# Patient Record
Sex: FEMALE | Race: WHITE | NOT HISPANIC OR LATINO | URBAN - METROPOLITAN AREA
[De-identification: names, ages, dates, MRNs, and addresses within clinical notes are randomized per-mention and may not be internally consistent; named-entity substitution may affect disease eponyms.]

---

## 2022-01-20 ENCOUNTER — OUTPATIENT (OUTPATIENT)
Dept: OUTPATIENT SERVICES | Facility: HOSPITAL | Age: 72
LOS: 1 days | End: 2022-01-20
Payer: MEDICARE

## 2022-01-20 VITALS
SYSTOLIC BLOOD PRESSURE: 120 MMHG | TEMPERATURE: 98 F | DIASTOLIC BLOOD PRESSURE: 86 MMHG | OXYGEN SATURATION: 96 % | HEART RATE: 89 BPM | HEIGHT: 66 IN | RESPIRATION RATE: 20 BRPM | WEIGHT: 195.33 LBS

## 2022-01-20 DIAGNOSIS — Z90.89 ACQUIRED ABSENCE OF OTHER ORGANS: Chronic | ICD-10-CM

## 2022-01-20 DIAGNOSIS — I48.20 CHRONIC ATRIAL FIBRILLATION, UNSPECIFIED: ICD-10-CM

## 2022-01-20 DIAGNOSIS — Z98.890 OTHER SPECIFIED POSTPROCEDURAL STATES: Chronic | ICD-10-CM

## 2022-01-20 DIAGNOSIS — Z90.722 ACQUIRED ABSENCE OF OVARIES, BILATERAL: Chronic | ICD-10-CM

## 2022-01-20 DIAGNOSIS — U07.1 COVID-19: ICD-10-CM

## 2022-01-20 DIAGNOSIS — Z01.818 ENCOUNTER FOR OTHER PREPROCEDURAL EXAMINATION: ICD-10-CM

## 2022-01-20 DIAGNOSIS — Z98.51 TUBAL LIGATION STATUS: Chronic | ICD-10-CM

## 2022-01-20 LAB
ANION GAP SERPL CALC-SCNC: 10 MMOL/L — SIGNIFICANT CHANGE UP (ref 5–17)
APTT BLD: 35.8 SEC — HIGH (ref 27.5–35.5)
BASOPHILS # BLD AUTO: 0.06 K/UL — SIGNIFICANT CHANGE UP (ref 0–0.2)
BASOPHILS NFR BLD AUTO: 0.8 % — SIGNIFICANT CHANGE UP (ref 0–2)
BLD GP AB SCN SERPL QL: SIGNIFICANT CHANGE UP
BUN SERPL-MCNC: 18.2 MG/DL — SIGNIFICANT CHANGE UP (ref 8–20)
CALCIUM SERPL-MCNC: 8.8 MG/DL — SIGNIFICANT CHANGE UP (ref 8.6–10.2)
CHLORIDE SERPL-SCNC: 105 MMOL/L — SIGNIFICANT CHANGE UP (ref 98–107)
CO2 SERPL-SCNC: 25 MMOL/L — SIGNIFICANT CHANGE UP (ref 22–29)
CREAT SERPL-MCNC: 0.69 MG/DL — SIGNIFICANT CHANGE UP (ref 0.5–1.3)
EOSINOPHIL # BLD AUTO: 0.25 K/UL — SIGNIFICANT CHANGE UP (ref 0–0.5)
EOSINOPHIL NFR BLD AUTO: 3.2 % — SIGNIFICANT CHANGE UP (ref 0–6)
GLUCOSE SERPL-MCNC: 97 MG/DL — SIGNIFICANT CHANGE UP (ref 70–99)
HCT VFR BLD CALC: 44.6 % — SIGNIFICANT CHANGE UP (ref 34.5–45)
HGB BLD-MCNC: 14.4 G/DL — SIGNIFICANT CHANGE UP (ref 11.5–15.5)
IMM GRANULOCYTES NFR BLD AUTO: 0.1 % — SIGNIFICANT CHANGE UP (ref 0–1.5)
INR BLD: 1.28 RATIO — HIGH (ref 0.88–1.16)
LYMPHOCYTES # BLD AUTO: 2.62 K/UL — SIGNIFICANT CHANGE UP (ref 1–3.3)
LYMPHOCYTES # BLD AUTO: 34 % — SIGNIFICANT CHANGE UP (ref 13–44)
MAGNESIUM SERPL-MCNC: 2.1 MG/DL — SIGNIFICANT CHANGE UP (ref 1.6–2.6)
MCHC RBC-ENTMCNC: 28.8 PG — SIGNIFICANT CHANGE UP (ref 27–34)
MCHC RBC-ENTMCNC: 32.3 GM/DL — SIGNIFICANT CHANGE UP (ref 32–36)
MCV RBC AUTO: 89.2 FL — SIGNIFICANT CHANGE UP (ref 80–100)
MONOCYTES # BLD AUTO: 0.64 K/UL — SIGNIFICANT CHANGE UP (ref 0–0.9)
MONOCYTES NFR BLD AUTO: 8.3 % — SIGNIFICANT CHANGE UP (ref 2–14)
NEUTROPHILS # BLD AUTO: 4.13 K/UL — SIGNIFICANT CHANGE UP (ref 1.8–7.4)
NEUTROPHILS NFR BLD AUTO: 53.6 % — SIGNIFICANT CHANGE UP (ref 43–77)
PLATELET # BLD AUTO: 183 K/UL — SIGNIFICANT CHANGE UP (ref 150–400)
POTASSIUM SERPL-MCNC: 4.4 MMOL/L — SIGNIFICANT CHANGE UP (ref 3.5–5.3)
POTASSIUM SERPL-SCNC: 4.4 MMOL/L — SIGNIFICANT CHANGE UP (ref 3.5–5.3)
PROTHROM AB SERPL-ACNC: 14.7 SEC — HIGH (ref 10.6–13.6)
RBC # BLD: 5 M/UL — SIGNIFICANT CHANGE UP (ref 3.8–5.2)
RBC # FLD: 12.4 % — SIGNIFICANT CHANGE UP (ref 10.3–14.5)
SODIUM SERPL-SCNC: 140 MMOL/L — SIGNIFICANT CHANGE UP (ref 135–145)
WBC # BLD: 7.71 K/UL — SIGNIFICANT CHANGE UP (ref 3.8–10.5)
WBC # FLD AUTO: 7.71 K/UL — SIGNIFICANT CHANGE UP (ref 3.8–10.5)

## 2022-01-20 PROCEDURE — 93010 ELECTROCARDIOGRAM REPORT: CPT

## 2022-01-20 PROCEDURE — G0463: CPT

## 2022-01-20 PROCEDURE — 93005 ELECTROCARDIOGRAM TRACING: CPT

## 2022-01-20 NOTE — H&P PST ADULT - NSICDXPASTMEDICALHX_GEN_ALL_CORE_FT
PAST MEDICAL HISTORY:  Chronic atrial fibrillation     DVT, lower extremity 1970's with BCP    History of basal cell carcinoma excision     History of IBS     ABRAHAM (obstructive sleep apnea) recent sleep study  with Dr. driscoll moderate no plans for treatment yet

## 2022-01-20 NOTE — H&P PST ADULT - EKG AND INTERPRETATION
atrial fibrillation 73  minimal voltage criteria for LVH may be normal variant   anteroseptal infarct   pending final interpretation

## 2022-01-20 NOTE — H&P PST ADULT - OTHER CARE PROVIDERS
Dr. Isidro  [phone] Dr Katerine Cantu [address] [place] [address] cardiology [facility]

## 2022-01-20 NOTE — H&P PST ADULT - NSICDXPASTSURGICALHX_GEN_ALL_CORE_FT
PAST SURGICAL HISTORY:  H/O basal cell carcinoma excision     H/O dilation and curettage     H/O tubal ligation     History of removal of both ovaries cysts    History of tonsillectomy

## 2022-01-20 NOTE — H&P PST ADULT - NSICDXFAMILYHX_GEN_ALL_CORE_FT
FAMILY HISTORY:  Sibling  Still living? Yes, Estimated age: Age Unknown  FH: atrial fibrillation, Age at diagnosis: Age Unknown

## 2022-01-20 NOTE — H&P PST ADULT - ASSESSMENT
Plan:   Labs pending.   Pre-procedure instructions provided (verbal & written) as follows:  Ablation */*/2021   - Last dose Eliquis/Pradaxa/Xarelto/Savaysa */*/2021 PM (NO a/c day of ablation).   OR - Continue warfarin without interruption.   - NPO after midnight prior except meds w/ sips of water.    - Hold the following medications the morning of the procedure: ***Check MD note for possible instructions re: holding antiarrhythmic medications*** 71 yr old female with longstanding persistent atrial fibrillation  presents for cardiac ablation with DR. Middleton. pt is vaccinated x 3 with [email or web address] will obtain a pre procedure covid swab pcr   Plan: afib ablation   Labs pending.   Pre-procedure instructions provided (verbal & written) as follows:  Ablation 1/28/2022   - Last dose Eliquis 1/27/2022 PM (NO a/c day of ablation).    - NPO after midnight prior except meds w/ sips of water. Triadylt ER in am    - Hold the following medications the morning of the procedure: eliquis    71 yr old female with longstanding persistent atrial fibrillation  presents for cardiac ablation with DR. Middleton. pt is vaccinated x 3 with [employer] will obtain a pre procedure covid swab pcr   Plan: afib ablation   Labs pending.   Pre-procedure instructions provided (verbal & written) as follows:  Ablation 2022   - Last dose Eliquis 2022 PM (NO a/c day of ablation).    - NPO after midnight prior except meds w/ sips of water. Triadylt ER in am    - Hold the following medications the morning of the procedure: eliquis   OPIOID RISK TOOL    RAFAL EACH BOX THAT APPLIES AND ADD TOTALS AT THE END    FAMILY HISTORY OF SUBSTANCE ABUSE                 FEMALE         MALE                                                Alcohol                             [  ]1 pt          [  ]3pts                                               Illegal Durgs                     [  ]2 pts        [  ]3pts                                               Rx Drugs                           [  ]4 pts        [  ]4 pts    PERSONAL HISTORY OF SUBSTANCE ABUSE                                                                                          Alcohol                             [  ]3 pts       [  ]3 pts                                               Illegal Durgs                     [  ]4 pts        [  ]4 pts                                               Rx Drugs                           [  ]5 pts        [  ]5 pts    AGE BETWEEN 16-45 YEARS                                      [  ]1 pt         [  ]1 pt    HISTORY OF PREADOLESCENT   SEXUAL ABUSE                                                             [  ]3 pts        [  ]0pts    PSYCHOLOGICAL DISEASE                     ADD, OCD, Bipolar, Schizophrenia        [  ]2 pts         [  ]2 pts                      Depression                                               [  ]1 pt           [  ]1 pt           SCORING TOTAL   (add numbers and type here)              (**0*)                                     A score of 3 or lower indicated LOW risk for future opiod abuse  A score of 4 to 7 indicated moderate risk for future opiod abuse  A score of 8 or higher indicates a high risk for opiod abuse  CAPRINI VTE 2.0 SCORE [CLOT updated 2019]    AGE RELATED RISK FACTORS                                                       MOBILITY RELATED FACTORS  [ ] Age 41-60 years                                            (1 Point)                    [ ] Bed rest                                                        (1 Point)  [X ] Age: 61-74 years                                           (2 Points)                  [ ] Plaster cast                                                   (2 Points)  [ ] Age= 75 years                                              (3 Points)                    [ ] Bed bound for more than 72 hours                 (2 Points)    DISEASE RELATED RISK FACTORS                                               GENDER SPECIFIC FACTORS  [ ] Edema in the lower extremities                       (1 Point)              [ ] Pregnancy                                                     (1 Point)  [ ] Varicose veins                                               (1 Point)                     [ ] Post-partum < 6 weeks                                   (1 Point)             [ X] BMI > 25 Kg/m2                                            (1 Point)                     [ ] Hormonal therapy  or oral contraception          (1 Point)                 [ ] Sepsis (in the previous month)                        (1 Point)               [ ] History of pregnancy complications                 (1 point)  [ ] Pneumonia or serious lung disease                                               [ ] Unexplained or recurrent                     (1 Point)           (in the previous month)                               (1 Point)  [ ] Abnormal pulmonary function test                     (1 Point)                 SURGERY RELATED RISK FACTORS  [ ] Acute myocardial infarction                              (1 Point)               [ ]  Section                                             (1 Point)  [ ] Congestive heart failure (in the previous month)  (1 Point)      [ ] Minor surgery                                                  (1 Point)   [ ] Inflammatory bowel disease                             (1 Point)               [ ] Arthroscopic surgery                                        (2 Points)  [ ] Central venous access                                      (2 Points)                [X ] General surgery lasting more than 45 minutes (2 points)  [x ] Malignancy- Present or previous                   (2 Points)                [ ] Elective arthroplasty                                         (5 points)    [ ] Stroke (in the previous month)                          (5 Points)                                                                                                                                                           HEMATOLOGY RELATED FACTORS                                                 TRAUMA RELATED RISK FACTORS  [ X] Prior episodes of VTE                                     (3 Points)                [ ] Fracture of the hip, pelvis, or leg                       (5 Points)  [ ] Positive family history for VTE                         (3 Points)             [ ] Acute spinal cord injury (in the previous month)  (5 Points)  [ ] Prothrombin 51245 A                                     (3 Points)               [ ] Paralysis  (less than 1 month)                             (5 Points)  [ ] Factor V Leiden                                             (3 Points)                  [ ] Multiple Trauma within 1 month                        (5 Points)  [ ] Lupus anticoagulants                                     (3 Points)                                                           [ ] Anticardiolipin antibodies                               (3 Points)                                                       [ ] High homocysteine in the blood                      (3 Points)                                             [ ] Other congenital or acquired thrombophilia      (3 Points)                                                [ ] Heparin induced thrombocytopenia                  (3 Points)                                     Total Score [    10      ]

## 2022-01-20 NOTE — H&P PST ADULT - HISTORY OF PRESENT ILLNESS
71y Female with history of _____________, and symptomatic **paroxysmal or persistent** atrial fibrillation. **add additional description as applicable**     Female now presents in anticipation of elective radiofrequency ablation of atrial fibrillation.       Echocardiogram (date):   Stress Test (date):  Cardiac CT or MRI (date):   Cardiac Cath (date):   Cardiac surgery (date):  71y Female with history of high cholesterol, IBS, atrial septal aneurysm  and symptomatic  persistent  atrial fibrillation over past 3 years . pt c/o fatigue with occ palp . pt is on triadylt  mg and eliquis 5 mg . pt would like to have ablation to eventually stop eliquis.   71 yr old Female now presents in anticipation of elective radiofrequency ablation of atrial fibrillation.       Echocardiogram (date): 10/21/21 ALEJANDRO LVEF normal no ZORA thrombus Aneurysmal interatrial septum with pfo /asd and moderate left to right shunting  mild- mod MR   Stress Test (date): 5/20/20 LVEF 72% normal perfusion imaging with attenuation artifact   Cardiac CT or MRI (date):   Cardiac Cath (date):   Cardiac surgery (date):

## 2022-01-20 NOTE — H&P PST ADULT - ATTENDING COMMENTS
All risks, benefits and alternatives discussed with patient. She agrees to proceed.    Jordan Middleton MD  Clinical Cardiac Electrophysiology

## 2022-01-21 ENCOUNTER — APPOINTMENT (OUTPATIENT)
Dept: CT IMAGING | Facility: CLINIC | Age: 72
End: 2022-01-21
Payer: MEDICARE

## 2022-01-21 ENCOUNTER — OUTPATIENT (OUTPATIENT)
Dept: OUTPATIENT SERVICES | Facility: HOSPITAL | Age: 72
LOS: 1 days | End: 2022-01-21
Payer: MEDICARE

## 2022-01-21 ENCOUNTER — RESULT REVIEW (OUTPATIENT)
Age: 72
End: 2022-01-21

## 2022-01-21 DIAGNOSIS — Z98.890 OTHER SPECIFIED POSTPROCEDURAL STATES: Chronic | ICD-10-CM

## 2022-01-21 DIAGNOSIS — Z90.89 ACQUIRED ABSENCE OF OTHER ORGANS: Chronic | ICD-10-CM

## 2022-01-21 DIAGNOSIS — Z90.722 ACQUIRED ABSENCE OF OVARIES, BILATERAL: Chronic | ICD-10-CM

## 2022-01-21 DIAGNOSIS — Z00.8 ENCOUNTER FOR OTHER GENERAL EXAMINATION: ICD-10-CM

## 2022-01-21 DIAGNOSIS — Z98.51 TUBAL LIGATION STATUS: Chronic | ICD-10-CM

## 2022-01-21 PROBLEM — I48.20 CHRONIC ATRIAL FIBRILLATION, UNSPECIFIED: Chronic | Status: ACTIVE | Noted: 2022-01-20

## 2022-01-21 PROBLEM — Z87.19 PERSONAL HISTORY OF OTHER DISEASES OF THE DIGESTIVE SYSTEM: Chronic | Status: ACTIVE | Noted: 2022-01-20

## 2022-01-21 PROBLEM — G47.33 OBSTRUCTIVE SLEEP APNEA (ADULT) (PEDIATRIC): Chronic | Status: ACTIVE | Noted: 2022-01-20

## 2022-01-21 PROBLEM — I82.409 ACUTE EMBOLISM AND THROMBOSIS OF UNSPECIFIED DEEP VEINS OF UNSPECIFIED LOWER EXTREMITY: Chronic | Status: ACTIVE | Noted: 2022-01-20

## 2022-01-21 PROCEDURE — 75572 CT HRT W/3D IMAGE: CPT | Mod: 26,MH

## 2022-01-21 PROCEDURE — 75572 CT HRT W/3D IMAGE: CPT | Mod: MH

## 2022-01-21 PROCEDURE — 82565 ASSAY OF CREATININE: CPT

## 2022-02-01 ENCOUNTER — LABORATORY RESULT (OUTPATIENT)
Age: 72
End: 2022-02-01

## 2022-02-09 ENCOUNTER — NON-APPOINTMENT (OUTPATIENT)
Age: 72
End: 2022-02-09

## 2022-02-11 ENCOUNTER — TRANSCRIPTION ENCOUNTER (OUTPATIENT)
Age: 72
End: 2022-02-11

## 2022-02-11 ENCOUNTER — OUTPATIENT (OUTPATIENT)
Dept: OUTPATIENT SERVICES | Facility: HOSPITAL | Age: 72
LOS: 1 days | End: 2022-02-11
Payer: MEDICARE

## 2022-02-11 DIAGNOSIS — Z90.722 ACQUIRED ABSENCE OF OVARIES, BILATERAL: Chronic | ICD-10-CM

## 2022-02-11 DIAGNOSIS — Z98.890 OTHER SPECIFIED POSTPROCEDURAL STATES: Chronic | ICD-10-CM

## 2022-02-11 DIAGNOSIS — Z90.89 ACQUIRED ABSENCE OF OTHER ORGANS: Chronic | ICD-10-CM

## 2022-02-11 DIAGNOSIS — Z98.51 TUBAL LIGATION STATUS: Chronic | ICD-10-CM

## 2022-02-11 PROCEDURE — 93656 COMPRE EP EVAL ABLTJ ATR FIB: CPT

## 2022-02-11 PROCEDURE — 93657 TX L/R ATRIAL FIB ADDL: CPT

## 2022-02-11 PROCEDURE — 93623 PRGRMD STIMJ&PACG IV RX NFS: CPT | Mod: 26

## 2022-02-12 ENCOUNTER — TRANSCRIPTION ENCOUNTER (OUTPATIENT)
Age: 72
End: 2022-02-12

## 2022-02-12 PROCEDURE — 85027 COMPLETE CBC AUTOMATED: CPT

## 2022-02-12 PROCEDURE — 93623 PRGRMD STIMJ&PACG IV RX NFS: CPT

## 2022-02-12 PROCEDURE — C1732: CPT

## 2022-02-12 PROCEDURE — C1766: CPT

## 2022-02-12 PROCEDURE — 86901 BLOOD TYPING SEROLOGIC RH(D): CPT

## 2022-02-12 PROCEDURE — C1889: CPT

## 2022-02-12 PROCEDURE — 80048 BASIC METABOLIC PNL TOTAL CA: CPT

## 2022-02-12 PROCEDURE — 86850 RBC ANTIBODY SCREEN: CPT

## 2022-02-12 PROCEDURE — 36415 COLL VENOUS BLD VENIPUNCTURE: CPT

## 2022-02-12 PROCEDURE — 83735 ASSAY OF MAGNESIUM: CPT

## 2022-02-12 PROCEDURE — 93005 ELECTROCARDIOGRAM TRACING: CPT

## 2022-02-12 PROCEDURE — 93656 COMPRE EP EVAL ABLTJ ATR FIB: CPT

## 2022-02-12 PROCEDURE — 93657 TX L/R ATRIAL FIB ADDL: CPT

## 2022-02-12 PROCEDURE — C1731: CPT

## 2022-02-12 PROCEDURE — 86900 BLOOD TYPING SEROLOGIC ABO: CPT

## 2022-02-12 PROCEDURE — C1894: CPT

## 2022-02-16 DIAGNOSIS — I48.11 LONGSTANDING PERSISTENT ATRIAL FIBRILLATION: ICD-10-CM

## 2022-04-11 ENCOUNTER — APPOINTMENT (OUTPATIENT)
Dept: PHYSICAL MEDICINE AND REHAB | Facility: CLINIC | Age: 72
End: 2022-04-11
Payer: MEDICARE

## 2022-04-11 VITALS
RESPIRATION RATE: 16 BRPM | HEIGHT: 69 IN | SYSTOLIC BLOOD PRESSURE: 120 MMHG | OXYGEN SATURATION: 99 % | BODY MASS INDEX: 26.22 KG/M2 | WEIGHT: 177 LBS | TEMPERATURE: 97.5 F | DIASTOLIC BLOOD PRESSURE: 76 MMHG | HEART RATE: 81 BPM

## 2022-04-11 DIAGNOSIS — Z83.3 FAMILY HISTORY OF DIABETES MELLITUS: ICD-10-CM

## 2022-04-11 DIAGNOSIS — Z80.3 FAMILY HISTORY OF MALIGNANT NEOPLASM OF BREAST: ICD-10-CM

## 2022-04-11 DIAGNOSIS — Z78.9 OTHER SPECIFIED HEALTH STATUS: ICD-10-CM

## 2022-04-11 PROCEDURE — 36415 COLL VENOUS BLD VENIPUNCTURE: CPT

## 2022-04-11 PROCEDURE — 99213 OFFICE O/P EST LOW 20 MIN: CPT | Mod: 25

## 2022-04-11 RX ORDER — ROSUVASTATIN CALCIUM 20 MG/1
20 TABLET, FILM COATED ORAL DAILY
Refills: 0 | Status: ACTIVE | COMMUNITY

## 2022-04-11 NOTE — PLAN
History and Physical    CHIEF COMPLAINT:    Chief Complaint   Patient presents with   • New Patient     Bilateral knee pain, knees \"pop\" often, no recent falls/injuries       Patient is being seen in consultation at the request of Dr Flavia Kc    HPI:  Patient is a 59-year-old female coming in with bilateral knee pain right equal left. She has had the pain for multiple years. Pain is not that bad stable tendency about when she stands up it feels a popping sensation in the front of her knee. She tried ibuprofen. No physical therapy or injections recently. 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 16 g 12   • promethazine-dextromethorphan (PROMETHAZINE-DM) 6.25-15 MG/5ML syrup Take 5 mLs by mouth 4 times daily as needed for Cough. 118 mL 0   • azithromycin (ZITHROMAX) 500 MG tablet Take 1 tablet by mouth daily. 5 tablet 0   • Valacyclovir HCl (VALTREX) 1000 MG Tab 1 tablet twice daily for 3 days in the event of outbreaks 30 tablet 3   • doxycycline monohydrate (MONODOX) 100 MG capsule Take 1 capsule by mouth 2 times daily.     • Vitamin D, Ergocalciferol, 50625 units capsule Take 1 capsule by mouth 1 day a week. 24 capsule 0   • meloxicam (MOBIC) 15 MG tablet Take 1 tablet by mouth daily. 60 tablet 0     No current  facility-administered medications for this visit.      All:  ALLERGIES:  No Known Allergies  Fam Hx:    Family History   Problem Relation Age of Onset   • Diabetes Mother    • Hypertension Mother    • Diabetes Sister    • Diabetes Sister    • Diabetes Brother    • Diabetes Brother    • Diabetes Son    • Cancer Maternal Grandfather    • Hypertension Sister    • Hypertension Sister      Soc Hx:    Social History     Socioeconomic History   • Marital status: Single     Spouse name: Not on file   • Number of children: Not on file   • Years of education: Not on file   • Highest education level: Not on file   Occupational History   • Occupation: [employer] work     Employer: [employer]   Social Needs   • Financial resource strain: Not on file   • Food insecurity:     Worry: Not on file     Inability: Not on file   • Transportation needs:     Medical: Not on file     Non-medical: Not on file   Tobacco Use   • Smoking status: Never Smoker   • Smokeless tobacco: Never Used   Substance and Sexual Activity   • Alcohol use: No   • Drug use: No   • Sexual activity: Yes     Partners: Male   Lifestyle   • Physical activity:     Days per week: Not on file     Minutes per session: Not on file   • Stress: Not on file   Relationships   • Social connections:     Talks on phone: Not on file     Gets together: Not on file     Attends [employer] service: Not on file     Active member of club or organization: Not on file     Attends meetings of clubs or organizations: Not on file     Relationship status: Not on file   • Intimate partner violence:     Fear of current or ex partner: Not on file     Emotionally abused: Not on file     Physically abused: Not on file     Forced sexual activity: Not on file   Other Topics Concern   •  Service Not Asked   • Blood Transfusions Not Asked   • Caffeine Concern Not Asked   • Occupational Exposure Not Asked   • Hobby Hazards Not Asked   • Sleep Concern Not Asked   • Stress Concern Not Asked   •  Weight Concern Not Asked   • Special Diet Not Asked   • Back Care Not Asked   • Exercise No   • Bike Helmet Not Asked   • Seat Belt Yes   • Self-Exams No   Social History Narrative    Lives  With SO       12 pt review of system and medical history reviewed from intake form and have no additions    I have reviewed the nurse's note and intake form    PHYSICAL EXAMINATION:    Vitals:    Visit Vitals  Pulse 80   Ht 5' 8\" (1.727 m)   Wt 99.8 kg   BMI 33.47 kg/m²      Constitutional:  Well-developed, well-nourished female in no acute distress.  Lung:  Non-labored breathing.  Skin:  Warm, dry, intact without rash or lesion.  Psychiatric:  Alert and oriented x3.    Musculoskeletal:               Examination of bilateral knees today rather benign. No areas of tenderness felt. Negative patellar compression test. No joint line tenderness. Neurovascularly intact distally    RADIOGRAPHS:  X-rays until and 17 were reviewed showing some early arthritic changes including joint space narrowing greatest medially and the patella joint    IMPRESSION / PLAN:  Patient's pain is consistent with her DJD and I suspect new x-rays which show even worsening arthritis. Impingement not that bad and I don't believe she is doing any further harm to recommend nonoperative treatment. I offered her physical therapy. She doesn't feel the pain is bad enough for an injection. Right now she would like to hold off on anything and will continue with ibuprofen but if the pain gets bad enough she will call us up and we can order the physical therapy for her    Patient voiced understanding and agreed with the plan.  All questions were answered.     [FreeTextEntry1] : Labs Drawn and sent to [facility] labs.\par \par Patient to continue with present medications.\par \par Increase fluid intake..\par \par RTO in 7-10 days for re-eval..

## 2022-04-11 NOTE — PHYSICAL EXAM
[No Acute Distress] : no acute distress [Well Nourished] : well nourished [Well Developed] : well developed [Well-Appearing] : well-appearing [Normal Sclera/Conjunctiva] : normal sclera/conjunctiva [PERRL] : pupils equal round and reactive to light [EOMI] : extraocular movements intact [Normal Outer Ear/Nose] : the outer ears and nose were normal in appearance [Normal Oropharynx] : the oropharynx was normal [No JVD] : no jugular venous distention [No Lymphadenopathy] : no lymphadenopathy [Supple] : supple [Thyroid Normal, No Nodules] : the thyroid was normal and there were no nodules present [No Respiratory Distress] : no respiratory distress  [No Accessory Muscle Use] : no accessory muscle use [Clear to Auscultation] : lungs were clear to auscultation bilaterally [Normal Rate] : normal rate  [Regular Rhythm] : with a regular rhythm [Normal S1, S2] : normal S1 and S2 [No Carotid Bruits] : no carotid bruits [No Abdominal Bruit] : a ~M bruit was not heard ~T in the abdomen [No Varicosities] : no varicosities [Pedal Pulses Present] : the pedal pulses are present [No Edema] : there was no peripheral edema [No Palpable Aorta] : no palpable aorta [No Extremity Clubbing/Cyanosis] : no extremity clubbing/cyanosis [Soft] : abdomen soft [Non Tender] : non-tender [Non-distended] : non-distended [No Masses] : no abdominal mass palpated [No HSM] : no HSM [Normal Bowel Sounds] : normal bowel sounds [Normal Posterior Cervical Nodes] : no posterior cervical lymphadenopathy [Normal Anterior Cervical Nodes] : no anterior cervical lymphadenopathy [No CVA Tenderness] : no CVA  tenderness [No Spinal Tenderness] : no spinal tenderness [No Joint Swelling] : no joint swelling [Grossly Normal Strength/Tone] : grossly normal strength/tone [No Rash] : no rash [Coordination Grossly Intact] : coordination grossly intact [No Focal Deficits] : no focal deficits [Normal Gait] : normal gait [Deep Tendon Reflexes (DTR)] : deep tendon reflexes were 2+ and symmetric [Normal Affect] : the affect was normal [Normal Insight/Judgement] : insight and judgment were intact [de-identified] : murmur

## 2022-04-11 NOTE — HISTORY OF PRESENT ILLNESS
[FreeTextEntry1] : Cholesterol  [de-identified] : Patient presents today for re-evaluation of cholesterol. Patient recently had a cardiac ablation for afib and states he is doing well since procedure. States she has been doing well and has no new complaints at this time. Denies any CP, SOB or diff breathing. No recent fever, chills, cough or cold type symptoms. Has no other complaints at this time.

## 2022-04-12 LAB
25(OH)D3 SERPL-MCNC: 56.5 NG/ML
ALBUMIN SERPL ELPH-MCNC: 4.8 G/DL
ALP BLD-CCNC: 90 U/L
ALT SERPL-CCNC: 33 U/L
ANION GAP SERPL CALC-SCNC: 13 MMOL/L
AST SERPL-CCNC: 26 U/L
BASOPHILS # BLD AUTO: 0.08 K/UL
BASOPHILS NFR BLD AUTO: 1.3 %
BILIRUB SERPL-MCNC: 0.4 MG/DL
BUN SERPL-MCNC: 13 MG/DL
CALCIUM SERPL-MCNC: 9.3 MG/DL
CHLORIDE SERPL-SCNC: 103 MMOL/L
CHOLEST SERPL-MCNC: 152 MG/DL
CO2 SERPL-SCNC: 24 MMOL/L
CREAT SERPL-MCNC: 0.82 MG/DL
EGFR: 77 ML/MIN/1.73M2
EOSINOPHIL # BLD AUTO: 0.22 K/UL
EOSINOPHIL NFR BLD AUTO: 3.5 %
GLUCOSE SERPL-MCNC: 92 MG/DL
HCT VFR BLD CALC: 45.1 %
HDLC SERPL-MCNC: 59 MG/DL
HGB BLD-MCNC: 14.5 G/DL
IMM GRANULOCYTES NFR BLD AUTO: 0.2 %
LDLC SERPL CALC-MCNC: 81 MG/DL
LYMPHOCYTES # BLD AUTO: 2.13 K/UL
LYMPHOCYTES NFR BLD AUTO: 33.8 %
MAN DIFF?: NORMAL
MCHC RBC-ENTMCNC: 28.5 PG
MCHC RBC-ENTMCNC: 32.2 GM/DL
MCV RBC AUTO: 88.8 FL
MONOCYTES # BLD AUTO: 0.48 K/UL
MONOCYTES NFR BLD AUTO: 7.6 %
NEUTROPHILS # BLD AUTO: 3.39 K/UL
NEUTROPHILS NFR BLD AUTO: 53.6 %
NONHDLC SERPL-MCNC: 93 MG/DL
PLATELET # BLD AUTO: 220 K/UL
POTASSIUM SERPL-SCNC: 4.1 MMOL/L
PROT SERPL-MCNC: 7.2 G/DL
RBC # BLD: 5.08 M/UL
RBC # FLD: 13 %
SODIUM SERPL-SCNC: 140 MMOL/L
TRIGL SERPL-MCNC: 60 MG/DL
WBC # FLD AUTO: 6.31 K/UL

## 2022-04-21 ENCOUNTER — APPOINTMENT (OUTPATIENT)
Dept: PHYSICAL MEDICINE AND REHAB | Facility: CLINIC | Age: 72
End: 2022-04-21
Payer: MEDICARE

## 2022-04-21 VITALS — HEIGHT: 69 IN | BODY MASS INDEX: 26.22 KG/M2 | WEIGHT: 177 LBS

## 2022-04-21 PROCEDURE — 96133 NRPSYC TST EVAL PHYS/QHP EA: CPT

## 2022-04-21 RX ORDER — FUROSEMIDE 20 MG/1
20 TABLET ORAL
Qty: 2 | Refills: 0 | Status: DISCONTINUED | COMMUNITY
Start: 2022-02-12 | End: 2022-04-21

## 2022-04-21 NOTE — HISTORY OF PRESENT ILLNESS
[FreeTextEntry1] : Cholesterol [de-identified] : Patient encounter today for follow up on cholesterol.  States she has been doing well.  Denies any CP, SOB or diff breathing.  No recent fever, chills, cough or cold type symptoms.  Has no other complaints at this time.

## 2022-04-21 NOTE — PLAN
[FreeTextEntry1] : Labs reviewed with patient during this encounter.\par \par Patient to continue with present medications. \par Patient to start Vitamin therapy as discussed during visit - See medications below. \par Increase fluid intake.. \par RTO for repeat labs in 6 months. \par RTO in 6 months for re-evaluation. \par Diet teaching performed in depth during this visit related to cholesterol. \par At least 25 minutes was spent with patient via video conference reviewing findings/results during this visit. Ample time was provided to answer any questions or address concerns to the patients satisfaction..\par \par Patient was advised that this audiovisual encounter constitutes a billable visit, and that the visit will be billed on the same terms and conditions as an in-office, face-to-face visit. Patient was further advised they may be responsible for any co-payment, co-insurance, or other self-payment they would normally pay for an office visit, unless such self-payment was waived by their insurance carrier.\par

## 2022-07-25 ENCOUNTER — FORM ENCOUNTER (OUTPATIENT)
Age: 72
End: 2022-07-25

## 2022-07-26 ENCOUNTER — APPOINTMENT (OUTPATIENT)
Dept: PHYSICAL MEDICINE AND REHAB | Facility: CLINIC | Age: 72
End: 2022-07-26

## 2022-07-26 ENCOUNTER — RESULT REVIEW (OUTPATIENT)
Age: 72
End: 2022-07-26

## 2022-07-26 VITALS
DIASTOLIC BLOOD PRESSURE: 70 MMHG | SYSTOLIC BLOOD PRESSURE: 116 MMHG | BODY MASS INDEX: 26.66 KG/M2 | HEART RATE: 70 BPM | WEIGHT: 180 LBS | RESPIRATION RATE: 16 BRPM | TEMPERATURE: 97.2 F | OXYGEN SATURATION: 97 % | HEIGHT: 69 IN

## 2022-07-26 DIAGNOSIS — Z92.89 PERSONAL HISTORY OF OTHER MEDICAL TREATMENT: ICD-10-CM

## 2022-07-26 PROCEDURE — 99214 OFFICE O/P EST MOD 30 MIN: CPT

## 2022-07-26 NOTE — PLAN
[FreeTextEntry1] : All documents from hospital reviewed during this encounter - labs were normal.  CXR - normal.  Patient did not have CT chest performed\par \par CT of chest ordered today - Addendum - results small atelectasis along right cardiophrenic angle. No PE\par \par Patient to continue with present medications.\par \par Start Meloxicam as prescribed.\par \par Increase fluid intake. and rest.\par \par RTO in 7-10 days for re-eval..\par \par At least 30 minutes was spent with patient face to face examining and reviewing findings/results during this visit. Ample time was provided to answer any questions or address concerns to the patients satisfaction..\par \par I, Siddharth Hampton , attest that this documentation has been prepared under the direction and in the presence of Provider Ameya Isidro DNP\par The documentation recorded by the scribe, in my presence, accurately reflects the service I personally performed, and the decisions made by me with my edits as appropriate.\par Ameya Isidro DNP\par

## 2022-07-26 NOTE — PHYSICAL EXAM
[No Acute Distress] : no acute distress [Well Nourished] : well nourished [Well Developed] : well developed [Well-Appearing] : well-appearing [Normal Sclera/Conjunctiva] : normal sclera/conjunctiva [PERRL] : pupils equal round and reactive to light [EOMI] : extraocular movements intact [Normal Outer Ear/Nose] : the outer ears and nose were normal in appearance [Normal Oropharynx] : the oropharynx was normal [No JVD] : no jugular venous distention [No Lymphadenopathy] : no lymphadenopathy [Supple] : supple [Thyroid Normal, No Nodules] : the thyroid was normal and there were no nodules present [No Respiratory Distress] : no respiratory distress  [No Accessory Muscle Use] : no accessory muscle use [Clear to Auscultation] : lungs were clear to auscultation bilaterally [Normal Rate] : normal rate  [Regular Rhythm] : with a regular rhythm [Normal S1, S2] : normal S1 and S2 [No Carotid Bruits] : no carotid bruits [No Abdominal Bruit] : a ~M bruit was not heard ~T in the abdomen [No Varicosities] : no varicosities [Pedal Pulses Present] : the pedal pulses are present [No Edema] : there was no peripheral edema [No Palpable Aorta] : no palpable aorta [No Extremity Clubbing/Cyanosis] : no extremity clubbing/cyanosis [Soft] : abdomen soft [Non Tender] : non-tender [Non-distended] : non-distended [No Masses] : no abdominal mass palpated [No HSM] : no HSM [Normal Bowel Sounds] : normal bowel sounds [Normal Posterior Cervical Nodes] : no posterior cervical lymphadenopathy [Normal Anterior Cervical Nodes] : no anterior cervical lymphadenopathy [No CVA Tenderness] : no CVA  tenderness [No Spinal Tenderness] : no spinal tenderness [No Joint Swelling] : no joint swelling [Grossly Normal Strength/Tone] : grossly normal strength/tone [No Rash] : no rash [Coordination Grossly Intact] : coordination grossly intact [No Focal Deficits] : no focal deficits [Normal Gait] : normal gait [Deep Tendon Reflexes (DTR)] : deep tendon reflexes were 2+ and symmetric [Normal Affect] : the affect was normal [Normal Insight/Judgement] : insight and judgment were intact [de-identified] : murmur  [de-identified] : mild tenderness near the mid clavicular line 2,3,4th ribs, no deformity or edema noted.

## 2022-07-26 NOTE — HISTORY OF PRESENT ILLNESS
[FreeTextEntry1] : recent hospitalization , chest pain [de-identified] : Patient presents in office to follow up on recent hospitalization. Patient was having right upper chest pain for x2 days. Patient states the pain was radiating from her chest to her back. Patient is having the pain at this time which increases with movement or breathing.. Patient denies any new exercises or trauma. Denies any SOB or diff breathing. No recent fever, chills, cough or cold type symptoms. Has no other complaints at this time.

## 2022-07-28 ENCOUNTER — FORM ENCOUNTER (OUTPATIENT)
Age: 72
End: 2022-07-28

## 2022-09-09 ENCOUNTER — FORM ENCOUNTER (OUTPATIENT)
Age: 72
End: 2022-09-09

## 2022-09-09 ENCOUNTER — APPOINTMENT (OUTPATIENT)
Dept: PHYSICAL MEDICINE AND REHAB | Facility: CLINIC | Age: 72
End: 2022-09-09

## 2022-09-09 VITALS
WEIGHT: 180 LBS | DIASTOLIC BLOOD PRESSURE: 62 MMHG | SYSTOLIC BLOOD PRESSURE: 118 MMHG | BODY MASS INDEX: 26.66 KG/M2 | HEIGHT: 69 IN | RESPIRATION RATE: 16 BRPM | TEMPERATURE: 96.2 F | HEART RATE: 68 BPM | OXYGEN SATURATION: 98 %

## 2022-09-09 DIAGNOSIS — Z87.09 PERSONAL HISTORY OF OTHER DISEASES OF THE RESPIRATORY SYSTEM: ICD-10-CM

## 2022-09-09 PROCEDURE — 93000 ELECTROCARDIOGRAM COMPLETE: CPT

## 2022-09-09 PROCEDURE — 99214 OFFICE O/P EST MOD 30 MIN: CPT | Mod: 25

## 2022-09-09 RX ORDER — MELOXICAM 15 MG/1
15 TABLET ORAL
Qty: 15 | Refills: 0 | Status: DISCONTINUED | COMMUNITY
Start: 2022-07-26 | End: 2022-09-09

## 2022-09-09 RX ORDER — AZITHROMYCIN 250 MG/1
250 TABLET, FILM COATED ORAL
Qty: 6 | Refills: 0 | Status: DISCONTINUED | COMMUNITY
Start: 2022-02-02 | End: 2022-09-09

## 2022-09-09 RX ORDER — PANTOPRAZOLE 40 MG/1
40 TABLET, DELAYED RELEASE ORAL
Qty: 30 | Refills: 0 | Status: DISCONTINUED | COMMUNITY
Start: 2022-03-17 | End: 2022-09-09

## 2022-09-09 RX ORDER — DILTIAZEM HYDROCHLORIDE 180 MG/1
180 CAPSULE, EXTENDED RELEASE ORAL
Qty: 90 | Refills: 0 | Status: DISCONTINUED | COMMUNITY
Start: 2021-05-17 | End: 2022-09-09

## 2022-09-09 RX ORDER — SUCRALFATE 1 G/10ML
1 SUSPENSION ORAL
Qty: 300 | Refills: 0 | Status: DISCONTINUED | COMMUNITY
Start: 2022-02-12 | End: 2022-09-09

## 2022-09-09 RX ORDER — COLCHICINE 0.6 MG/1
0.6 TABLET ORAL
Qty: 14 | Refills: 0 | Status: DISCONTINUED | COMMUNITY
Start: 2022-02-14 | End: 2022-09-09

## 2022-09-09 RX ORDER — APIXABAN 5 MG/1
5 TABLET, FILM COATED ORAL
Refills: 0 | Status: DISCONTINUED | COMMUNITY
End: 2022-09-09

## 2022-09-09 NOTE — REVIEW OF SYSTEMS
[Fatigue] : fatigue [Palpitations] : palpitations [Dyspnea on Exertion] : dyspnea on exertion [Dizziness] : dizziness [Fever] : no fever [Chills] : no chills [Discharge] : no discharge [Vision Problems] : no vision problems [Hoarseness] : no hoarseness [Nasal Discharge] : no nasal discharge [Sore Throat] : no sore throat [Chest Pain] : no chest pain [Lower Ext Edema] : no lower extremity edema [Orthopnea] : no orthopnea [Shortness Of Breath] : no shortness of breath [Wheezing] : no wheezing [Cough] : no cough [Abdominal Pain] : no abdominal pain [Nausea] : no nausea [Vomiting] : no vomiting [Heartburn] : no heartburn [Dysuria] : no dysuria [Hematuria] : no hematuria [Frequency] : no frequency [Muscle Weakness] : no muscle weakness [Muscle Pain] : no muscle pain [Skin Rash] : no skin rash [Headache] : no headache [Fainting] : no fainting [Confusion] : no confusion [Unsteady Walking] : no ataxia [Easy Bleeding] : no easy bleeding [Easy Bruising] : no easy bruising [Swollen Glands] : no swollen glands

## 2022-09-09 NOTE — HISTORY OF PRESENT ILLNESS
[FreeTextEntry8] : CC- Not feeling well \par \par Patient notes not feeling well over the past two days. She has a roney of Afib with ablation in February without any issues. She notes her heart has been fluctuating more with a HR max of 116. She had several episodes of diaphoresis's with episodes of right sided chest heaviness associated with feelings of lightheadedness that began after walking in her house and is alleviated with rest. She sees Dr. Howard [facility]. She is no longer on Eliquis. Denies any fever, chills, cough, n/v/d, numbness/tingling, weakness, HA, or ataxia.

## 2022-09-09 NOTE — PLAN
[FreeTextEntry1] : EKG  Irregularly Irregular rhythm  Qtc 437. No acute T wave changes noted.Afib with RVR. \par Patient to continue with present medications.  \par Increase fluid intake. \par Referred patient to the ER and requested she go by ambulance. Patient refused and will drive herself and she has driven herself to the office today. I informed her of the risks for driving herself and she is aware. Patient is stable and VS are stable. Directed patient go directly to the ER which is adjacent to our building. \par \par Called patients cardiologist Dr. Howard and l/m with  that patient will need consult at [facility]. \par Called [place] [facility] and provided report to JENNIFER Valdivia. \par \par 45 minutes was spent with patient reviewing findings/results during this visit. Ample time was provided to answer any questions or address concerns to the patients satisfaction.. \par \par

## 2022-09-09 NOTE — PHYSICAL EXAM
[No Acute Distress] : no acute distress [Well Nourished] : well nourished [Well Developed] : well developed [Well-Appearing] : well-appearing [Normal Sclera/Conjunctiva] : normal sclera/conjunctiva [PERRL] : pupils equal round and reactive to light [No Respiratory Distress] : no respiratory distress  [No Accessory Muscle Use] : no accessory muscle use [Normal S1, S2] : normal S1 and S2 [No Edema] : there was no peripheral edema [Normal Anterior Cervical Nodes] : no anterior cervical lymphadenopathy [Coordination Grossly Intact] : coordination grossly intact [No Focal Deficits] : no focal deficits [Normal Gait] : normal gait [Speech Grossly Normal] : speech grossly normal [Memory Grossly Normal] : memory grossly normal [Normal Affect] : the affect was normal [Normal Mood] : the mood was normal [Normal Insight/Judgement] : insight and judgment were intact [de-identified] : irregularly irregular heart rate, tachycardic, and systolic murmur noted.

## 2022-09-30 ENCOUNTER — OUTPATIENT (OUTPATIENT)
Dept: OUTPATIENT SERVICES | Facility: HOSPITAL | Age: 72
LOS: 1 days | End: 2022-09-30
Payer: MEDICARE

## 2022-09-30 VITALS
TEMPERATURE: 97 F | RESPIRATION RATE: 17 BRPM | OXYGEN SATURATION: 96 % | WEIGHT: 190.48 LBS | HEART RATE: 54 BPM | DIASTOLIC BLOOD PRESSURE: 56 MMHG | SYSTOLIC BLOOD PRESSURE: 100 MMHG | HEIGHT: 69 IN

## 2022-09-30 DIAGNOSIS — Z98.890 OTHER SPECIFIED POSTPROCEDURAL STATES: Chronic | ICD-10-CM

## 2022-09-30 DIAGNOSIS — I48.20 CHRONIC ATRIAL FIBRILLATION, UNSPECIFIED: ICD-10-CM

## 2022-09-30 DIAGNOSIS — Z01.818 ENCOUNTER FOR OTHER PREPROCEDURAL EXAMINATION: ICD-10-CM

## 2022-09-30 DIAGNOSIS — Z98.51 TUBAL LIGATION STATUS: Chronic | ICD-10-CM

## 2022-09-30 DIAGNOSIS — G47.33 OBSTRUCTIVE SLEEP APNEA (ADULT) (PEDIATRIC): ICD-10-CM

## 2022-09-30 DIAGNOSIS — Z90.722 ACQUIRED ABSENCE OF OVARIES, BILATERAL: Chronic | ICD-10-CM

## 2022-09-30 DIAGNOSIS — Z90.89 ACQUIRED ABSENCE OF OTHER ORGANS: Chronic | ICD-10-CM

## 2022-09-30 DIAGNOSIS — Z91.89 OTHER SPECIFIED PERSONAL RISK FACTORS, NOT ELSEWHERE CLASSIFIED: ICD-10-CM

## 2022-09-30 LAB
ANION GAP SERPL CALC-SCNC: 11 MMOL/L — SIGNIFICANT CHANGE UP (ref 5–17)
APTT BLD: 34.9 SEC — SIGNIFICANT CHANGE UP (ref 27.5–35.5)
BASOPHILS # BLD AUTO: 0.06 K/UL — SIGNIFICANT CHANGE UP (ref 0–0.2)
BASOPHILS NFR BLD AUTO: 0.7 % — SIGNIFICANT CHANGE UP (ref 0–2)
BLD GP AB SCN SERPL QL: SIGNIFICANT CHANGE UP
BUN SERPL-MCNC: 21.3 MG/DL — HIGH (ref 8–20)
CALCIUM SERPL-MCNC: 9.2 MG/DL — SIGNIFICANT CHANGE UP (ref 8.4–10.5)
CHLORIDE SERPL-SCNC: 104 MMOL/L — SIGNIFICANT CHANGE UP (ref 98–107)
CO2 SERPL-SCNC: 26 MMOL/L — SIGNIFICANT CHANGE UP (ref 22–29)
CREAT SERPL-MCNC: 0.81 MG/DL — SIGNIFICANT CHANGE UP (ref 0.5–1.3)
EGFR: 78 ML/MIN/1.73M2 — SIGNIFICANT CHANGE UP
EOSINOPHIL # BLD AUTO: 0.24 K/UL — SIGNIFICANT CHANGE UP (ref 0–0.5)
EOSINOPHIL NFR BLD AUTO: 2.9 % — SIGNIFICANT CHANGE UP (ref 0–6)
GLUCOSE SERPL-MCNC: 80 MG/DL — SIGNIFICANT CHANGE UP (ref 70–99)
HCT VFR BLD CALC: 41.7 % — SIGNIFICANT CHANGE UP (ref 34.5–45)
HGB BLD-MCNC: 13.5 G/DL — SIGNIFICANT CHANGE UP (ref 11.5–15.5)
IMM GRANULOCYTES NFR BLD AUTO: 0.2 % — SIGNIFICANT CHANGE UP (ref 0–0.9)
INR BLD: 1.43 RATIO — HIGH (ref 0.88–1.16)
LYMPHOCYTES # BLD AUTO: 2.45 K/UL — SIGNIFICANT CHANGE UP (ref 1–3.3)
LYMPHOCYTES # BLD AUTO: 29.4 % — SIGNIFICANT CHANGE UP (ref 13–44)
MAGNESIUM SERPL-MCNC: 2 MG/DL — SIGNIFICANT CHANGE UP (ref 1.6–2.6)
MCHC RBC-ENTMCNC: 28.7 PG — SIGNIFICANT CHANGE UP (ref 27–34)
MCHC RBC-ENTMCNC: 32.4 GM/DL — SIGNIFICANT CHANGE UP (ref 32–36)
MCV RBC AUTO: 88.7 FL — SIGNIFICANT CHANGE UP (ref 80–100)
MONOCYTES # BLD AUTO: 0.72 K/UL — SIGNIFICANT CHANGE UP (ref 0–0.9)
MONOCYTES NFR BLD AUTO: 8.6 % — SIGNIFICANT CHANGE UP (ref 2–14)
NEUTROPHILS # BLD AUTO: 4.84 K/UL — SIGNIFICANT CHANGE UP (ref 1.8–7.4)
NEUTROPHILS NFR BLD AUTO: 58.2 % — SIGNIFICANT CHANGE UP (ref 43–77)
PLATELET # BLD AUTO: 207 K/UL — SIGNIFICANT CHANGE UP (ref 150–400)
POTASSIUM SERPL-MCNC: 4.3 MMOL/L — SIGNIFICANT CHANGE UP (ref 3.5–5.3)
POTASSIUM SERPL-SCNC: 4.3 MMOL/L — SIGNIFICANT CHANGE UP (ref 3.5–5.3)
PROTHROM AB SERPL-ACNC: 16.7 SEC — HIGH (ref 10.5–13.4)
RBC # BLD: 4.7 M/UL — SIGNIFICANT CHANGE UP (ref 3.8–5.2)
RBC # FLD: 12.2 % — SIGNIFICANT CHANGE UP (ref 10.3–14.5)
SARS-COV-2 RNA SPEC QL NAA+PROBE: SIGNIFICANT CHANGE UP
SODIUM SERPL-SCNC: 141 MMOL/L — SIGNIFICANT CHANGE UP (ref 135–145)
WBC # BLD: 8.33 K/UL — SIGNIFICANT CHANGE UP (ref 3.8–10.5)
WBC # FLD AUTO: 8.33 K/UL — SIGNIFICANT CHANGE UP (ref 3.8–10.5)

## 2022-09-30 PROCEDURE — 93010 ELECTROCARDIOGRAM REPORT: CPT

## 2022-09-30 PROCEDURE — 93005 ELECTROCARDIOGRAM TRACING: CPT

## 2022-09-30 RX ORDER — DILTIAZEM HCL 120 MG
1 CAPSULE, EXT RELEASE 24 HR ORAL
Qty: 0 | Refills: 0 | DISCHARGE

## 2022-09-30 NOTE — H&P PST ADULT - NSICDXPASTSURGICALHX_GEN_ALL_CORE_FT
PAST SURGICAL HISTORY:  H/O basal cell carcinoma excision     H/O dilation and curettage     H/O tubal ligation     History of removal of both ovaries cysts    History of tonsillectomy      PAST SURGICAL HISTORY:  H/O basal cell carcinoma excision     H/O dilation and curettage     H/O tubal ligation     History of removal of both ovaries cysts    History of tonsillectomy     S/P ablation of atrial fibrillation 02/2022

## 2022-09-30 NOTE — H&P PST ADULT - ASSESSMENT
71 yr old female with longstanding persistent atrial fibrillation  presents for cardiac ablation with DR. Middleton. pt is vaccinated x 3 with [employer] will obtain a pre procedure covid swab pcr   Plan: afib ablation   Labs pending.   Pre-procedure instructions provided (verbal & written) as follows:  Ablation 2022   - Last dose Eliquis 2022 PM (NO a/c day of ablation).    - NPO after midnight prior except meds w/ sips of water. [facility] in am    - Hold the following medications the morning of the procedure: eliquis   OPIOID RISK TOOL    RAFAL EACH BOX THAT APPLIES AND ADD TOTALS AT THE END    FAMILY HISTORY OF SUBSTANCE ABUSE                 FEMALE         MALE                                                Alcohol                             [  ]1 pt          [  ]3pts                                               Illegal Durgs                     [  ]2 pts        [  ]3pts                                               Rx Drugs                           [  ]4 pts        [  ]4 pts    PERSONAL HISTORY OF SUBSTANCE ABUSE                                                                                          Alcohol                             [  ]3 pts       [  ]3 pts                                               Illegal Durgs                     [  ]4 pts        [  ]4 pts                                               Rx Drugs                           [  ]5 pts        [  ]5 pts    AGE BETWEEN 16-45 YEARS                                      [  ]1 pt         [  ]1 pt    HISTORY OF PREADOLESCENT   SEXUAL ABUSE                                                             [  ]3 pts        [  ]0pts    PSYCHOLOGICAL DISEASE                     ADD, OCD, Bipolar, Schizophrenia        [  ]2 pts         [  ]2 pts                      Depression                                               [  ]1 pt           [  ]1 pt           SCORING TOTAL   (add numbers and type here)              (**0*)                                     A score of 3 or lower indicated LOW risk for future opiod abuse  A score of 4 to 7 indicated moderate risk for future opiod abuse  A score of 8 or higher indicates a high risk for opiod abuse  CAPRINI VTE 2.0 SCORE [CLOT updated 2019]    AGE RELATED RISK FACTORS                                                       MOBILITY RELATED FACTORS  [ ] Age 41-60 years                                            (1 Point)                    [ ] Bed rest                                                        (1 Point)  [X ] Age: 61-74 years                                           (2 Points)                  [ ] Plaster cast                                                   (2 Points)  [ ] Age= 75 years                                              (3 Points)                    [ ] Bed bound for more than 72 hours                 (2 Points)    DISEASE RELATED RISK FACTORS                                               GENDER SPECIFIC FACTORS  [ ] Edema in the lower extremities                       (1 Point)              [ ] Pregnancy                                                     (1 Point)  [ ] Varicose veins                                               (1 Point)                     [ ] Post-partum < 6 weeks                                   (1 Point)             [ X] BMI > 25 Kg/m2                                            (1 Point)                     [ ] Hormonal therapy  or oral contraception          (1 Point)                 [ ] Sepsis (in the previous month)                        (1 Point)               [ ] History of pregnancy complications                 (1 point)  [ ] Pneumonia or serious lung disease                                               [ ] Unexplained or recurrent                     (1 Point)           (in the previous month)                               (1 Point)  [ ] Abnormal pulmonary function test                     (1 Point)                 SURGERY RELATED RISK FACTORS  [ ] Acute myocardial infarction                              (1 Point)               [ ]  Section                                             (1 Point)  [ ] Congestive heart failure (in the previous month)  (1 Point)      [ ] Minor surgery                                                  (1 Point)   [ ] Inflammatory bowel disease                             (1 Point)               [ ] Arthroscopic surgery                                        (2 Points)  [ ] Central venous access                                      (2 Points)                [X ] General surgery lasting more than 45 minutes (2 points)  [x ] Malignancy- Present or previous                   (2 Points)                [ ] Elective arthroplasty                                         (5 points)    [ ] Stroke (in the previous month)                          (5 Points)                                                                                                                                                           HEMATOLOGY RELATED FACTORS                                                 TRAUMA RELATED RISK FACTORS  [ X] Prior episodes of VTE                                     (3 Points)                [ ] Fracture of the hip, pelvis, or leg                       (5 Points)  [ ] Positive family history for VTE                         (3 Points)             [ ] Acute spinal cord injury (in the previous month)  (5 Points)  [ ] Prothrombin 20968 A                                     (3 Points)               [ ] Paralysis  (less than 1 month)                             (5 Points)  [ ] Factor V Leiden                                             (3 Points)                  [ ] Multiple Trauma within 1 month                        (5 Points)  [ ] Lupus anticoagulants                                     (3 Points)                                                           [ ] Anticardiolipin antibodies                               (3 Points)                                                       [ ] High homocysteine in the blood                      (3 Points)                                             [ ] Other congenital or acquired thrombophilia      (3 Points)                                                [ ] Heparin induced thrombocytopenia                  (3 Points)                                     Total Score [    10      ] 71 yr old female with longstanding persistent atrial fibrillation  presents for cardiac ablation with DR. Middleton. pt is vaccinated x 3 with [employer] will obtain a pre procedure covid swab pcr   Plan: afib ablation   Labs pending.   Pre-procedure instructions provided (verbal & written) as follows:  Ablation 10/05/2022   - Last dose Eliquis 10/4/2022 PM (NO a/c day of ablation).    - NPO after midnight prior except meds w/ sips of water   - Hold the following medications the morning of the procedure: eliquis   - Covid-19 PCR done at [facility]   - Outpatient medication record reviewed with patient     OPIOID RISK TOOL    RAFAL EACH BOX THAT APPLIES AND ADD TOTALS AT THE END    FAMILY HISTORY OF SUBSTANCE ABUSE                 FEMALE         MALE                                                Alcohol                             [  ]1 pt          [  ]3pts                                               Illegal Durgs                     [  ]2 pts        [  ]3pts                                               Rx Drugs                           [  ]4 pts        [  ]4 pts    PERSONAL HISTORY OF SUBSTANCE ABUSE                                                                                          Alcohol                             [  ]3 pts       [  ]3 pts                                               Illegal Durgs                     [  ]4 pts        [  ]4 pts                                               Rx Drugs                           [  ]5 pts        [  ]5 pts    AGE BETWEEN 16-45 YEARS                                      [  ]1 pt         [  ]1 pt    HISTORY OF PREADOLESCENT   SEXUAL ABUSE                                                             [  ]3 pts        [  ]0pts    PSYCHOLOGICAL DISEASE                     ADD, OCD, Bipolar, Schizophrenia        [  ]2 pts         [  ]2 pts                      Depression                                               [  ]1 pt           [  ]1 pt           SCORING TOTAL   (add numbers and type here)              (**0*)                                     A score of 3 or lower indicated LOW risk for future opiod abuse  A score of 4 to 7 indicated moderate risk for future opiod abuse  A score of 8 or higher indicates a high risk for opiod abuse  CAPRINI VTE 2.0 SCORE [CLOT updated 2019]    AGE RELATED RISK FACTORS                                                       MOBILITY RELATED FACTORS  [ ] Age 41-60 years                                            (1 Point)                    [ ] Bed rest                                                        (1 Point)  [X ] Age: 61-74 years                                           (2 Points)                  [ ] Plaster cast                                                   (2 Points)  [ ] Age= 75 years                                              (3 Points)                    [ ] Bed bound for more than 72 hours                 (2 Points)    DISEASE RELATED RISK FACTORS                                               GENDER SPECIFIC FACTORS  [ ] Edema in the lower extremities                       (1 Point)              [ ] Pregnancy                                                     (1 Point)  [ ] Varicose veins                                               (1 Point)                     [ ] Post-partum < 6 weeks                                   (1 Point)             [ X] BMI > 25 Kg/m2                                            (1 Point)                     [ ] Hormonal therapy  or oral contraception          (1 Point)                 [ ] Sepsis (in the previous month)                        (1 Point)               [ ] History of pregnancy complications                 (1 point)  [ ] Pneumonia or serious lung disease                                               [ ] Unexplained or recurrent                     (1 Point)           (in the previous month)                               (1 Point)  [ ] Abnormal pulmonary function test                     (1 Point)                 SURGERY RELATED RISK FACTORS  [ ] Acute myocardial infarction                              (1 Point)               [ ]  Section                                             (1 Point)  [ ] Congestive heart failure (in the previous month)  (1 Point)      [ ] Minor surgery                                                  (1 Point)   [ ] Inflammatory bowel disease                             (1 Point)               [ ] Arthroscopic surgery                                        (2 Points)  [ ] Central venous access                                      (2 Points)                [X ] General surgery lasting more than 45 minutes (2 points)  [x ] Malignancy- Present or previous                   (2 Points)                [ ] Elective arthroplasty                                         (5 points)    [ ] Stroke (in the previous month)                          (5 Points)                                                                                                                                                           HEMATOLOGY RELATED FACTORS                                                 TRAUMA RELATED RISK FACTORS  [ X] Prior episodes of VTE                                     (3 Points)                [ ] Fracture of the hip, pelvis, or leg                       (5 Points)  [ ] Positive family history for VTE                         (3 Points)             [ ] Acute spinal cord injury (in the previous month)  (5 Points)  [ ] Prothrombin 86089 A                                     (3 Points)               [ ] Paralysis  (less than 1 month)                             (5 Points)  [ ] Factor V Leiden                                             (3 Points)                  [ ] Multiple Trauma within 1 month                        (5 Points)  [ ] Lupus anticoagulants                                     (3 Points)                                                           [ ] Anticardiolipin antibodies                               (3 Points)                                                       [ ] High homocysteine in the blood                      (3 Points)                                             [ ] Other congenital or acquired thrombophilia      (3 Points)                                                [ ] Heparin induced thrombocytopenia                  (3 Points)                                     Total Score [    10      ] 72y Female with history of high cholesterol, IBS, atrial septal aneurysm, ABRAHAM and symptomatic persistent atrial fibrillation, S/P atrial fibrillation ablation in 2022. Two days after the ablation she was seen at [place] ER with complaints of chest pain, consistent with pericarditis which was resolved with colchicine and dose of steroids. States she did not have any symptoms of atrial fibrillation after the ablation in feb until the beginning of sept she had a sudden episode of lightheadedness with HR >100. She was seen at [place] ED and found to be in recurrent afib. She had a ALEJANDRO/CV while at [facility] which has helped maintain sinus rhythm. Denies chest pain, shortness of breath. dizziness or near syncope. She is currently taking Cardizem, Eliquis and metoprolol. Patient is scheduled for Afib ablation on 10/5/2022 with Dr. Middleton.     Plan: afib ablation   Labs pending.   Pre-procedure instructions provided (verbal & written) as follows:  Ablation 10/05/2022   - Last dose Eliquis 10/4/2022 PM (NO a/c day of ablation).    - NPO after midnight prior except meds w/ sips of water   - Hold the following medications the morning of the procedure: eliquis   - Covid-19 PCR done at [facility]   - Outpatient medication record reviewed with patient     OPIOID RISK TOOL    RAFAL EACH BOX THAT APPLIES AND ADD TOTALS AT THE END    FAMILY HISTORY OF SUBSTANCE ABUSE                 FEMALE         MALE                                                Alcohol                             [  ]1 pt          [  ]3pts                                               Illegal Durgs                     [  ]2 pts        [  ]3pts                                               Rx Drugs                           [  ]4 pts        [  ]4 pts    PERSONAL HISTORY OF SUBSTANCE ABUSE                                                                                          Alcohol                             [  ]3 pts       [  ]3 pts                                               Illegal Durgs                     [  ]4 pts        [  ]4 pts                                               Rx Drugs                           [  ]5 pts        [  ]5 pts    AGE BETWEEN 16-45 YEARS                                      [  ]1 pt         [  ]1 pt    HISTORY OF PREADOLESCENT   SEXUAL ABUSE                                                             [  ]3 pts        [  ]0pts    PSYCHOLOGICAL DISEASE                     ADD, OCD, Bipolar, Schizophrenia        [  ]2 pts         [  ]2 pts                      Depression                                               [  ]1 pt           [  ]1 pt           SCORING TOTAL   (add numbers and type here)              (**0*)                                     A score of 3 or lower indicated LOW risk for future opiod abuse  A score of 4 to 7 indicated moderate risk for future opiod abuse  A score of 8 or higher indicates a high risk for opiod abuse  CAPRINI VTE 2.0 SCORE [CLOT updated 2019]    AGE RELATED RISK FACTORS                                                       MOBILITY RELATED FACTORS  [ ] Age 41-60 years                                            (1 Point)                    [ ] Bed rest                                                        (1 Point)  [X ] Age: 61-74 years                                           (2 Points)                  [ ] Plaster cast                                                   (2 Points)  [ ] Age= 75 years                                              (3 Points)                    [ ] Bed bound for more than 72 hours                 (2 Points)    DISEASE RELATED RISK FACTORS                                               GENDER SPECIFIC FACTORS  [ ] Edema in the lower extremities                       (1 Point)              [ ] Pregnancy                                                     (1 Point)  [ ] Varicose veins                                               (1 Point)                     [ ] Post-partum < 6 weeks                                   (1 Point)             [ X] BMI > 25 Kg/m2                                            (1 Point)                     [ ] Hormonal therapy  or oral contraception          (1 Point)                 [ ] Sepsis (in the previous month)                        (1 Point)               [ ] History of pregnancy complications                 (1 point)  [ ] Pneumonia or serious lung disease                                               [ ] Unexplained or recurrent                     (1 Point)           (in the previous month)                               (1 Point)  [ ] Abnormal pulmonary function test                     (1 Point)                 SURGERY RELATED RISK FACTORS  [ ] Acute myocardial infarction                              (1 Point)               [ ]  Section                                             (1 Point)  [ ] Congestive heart failure (in the previous month)  (1 Point)      [ ] Minor surgery                                                  (1 Point)   [ ] Inflammatory bowel disease                             (1 Point)               [ ] Arthroscopic surgery                                        (2 Points)  [ ] Central venous access                                      (2 Points)                [X ] General surgery lasting more than 45 minutes (2 points)  [x ] Malignancy- Present or previous                   (2 Points)                [ ] Elective arthroplasty                                         (5 points)    [ ] Stroke (in the previous month)                          (5 Points)                                                                                                                                                           HEMATOLOGY RELATED FACTORS                                                 TRAUMA RELATED RISK FACTORS  [ X] Prior episodes of VTE                                     (3 Points)                [ ] Fracture of the hip, pelvis, or leg                       (5 Points)  [ ] Positive family history for VTE                         (3 Points)             [ ] Acute spinal cord injury (in the previous month)  (5 Points)  [ ] Prothrombin 38725 A                                     (3 Points)               [ ] Paralysis  (less than 1 month)                             (5 Points)  [ ] Factor V Leiden                                             (3 Points)                  [ ] Multiple Trauma within 1 month                        (5 Points)  [ ] Lupus anticoagulants                                     (3 Points)                                                           [ ] Anticardiolipin antibodies                               (3 Points)                                                       [ ] High homocysteine in the blood                      (3 Points)                                             [ ] Other congenital or acquired thrombophilia      (3 Points)                                                [ ] Heparin induced thrombocytopenia                  (3 Points)                                     Total Score [    10      ]

## 2022-09-30 NOTE — H&P PST ADULT - OTHER CARE PROVIDERS
Dr. Isidro  [phone] Dr Katerine Cantu [address] [place] [address] cardi[address] [facility]

## 2022-09-30 NOTE — H&P PST ADULT - BLOOD TRANSFUSION, PREVIOUS, PROFILE
Dr. Ochoa: Dtr would like to set up care conference w/update for tomorrow AM. Could you call unit to set up time and I will update dtr? Have some other questions for you, too.     Addendum: Never received call back. Will update daughter that time will be set tomorrow AM.    no

## 2022-09-30 NOTE — H&P PST ADULT - WILL THE PATIENT ACCEPT THE PFIZER COVID-19 VACCINE IF ELIGIBLE AND IT IS AVAILABLE?
Occupational Therapy  Visit Type: treatment  Precautions:  Medical precautions:  fall risk; contact & special precautions.    Lines:     Basic: IV, NG, continuous pulse oximetry, urinary catheter and telemetry      Lines in chart and on patient reviewed, cautions maintained throughout session.  Safety Measures: bed alarm and sitter    SUBJECTIVE                                                                                                            Patient agreed to participate in therapy this date.  \"maybe next week\" regarding next sitting up    RNPrincess, approved of therapy session  Patient / Family Goal: return to previous functional status and maximize function      OBJECTIVE                                                                                                              Level of consciousness: alert    Oriented to person     Arousal alertness: inconsistent responses to stimuli    Affect/Behavior: confused and preoccupied  Patient activity tolerance: 1 to 2 activity to rest  Functional Communication/Cognition    Overall status:  Impaired    Form of communication:  Expressive deficits and receptive deficits     Attention span:  Difficulty attending to directions    Attention Span Impairment: reduced memory    Commands: follows one step commands with repetition.    Transition between tasks: unable to transition.    Awareness of deficits: not aware of deficits.  Additional Details: Writer removed environmental distractions ie turned off tv, closed door ect to decrease distractions during session.  Response better when stimuli is decreased.  Balance  Sitting:    Static:  total assist - dependent     Dynamic:  total assist - dependent    Bed mobility:      Repositioning in bed: total assist - dependent    Supine to sit: total assist - dependent    Sit to supine: total assist - dependent  Training completed:      Ttoal assist for bed mobility due to weakness and impaired balance.  Activities of Daily  Living (ADLs):  Grooming/Oral Hygiene:     Grooming assist: total assist - dependent    Oral hygiene assist: total assist - dependent    Assist needed for: wash/dry face and teeth care  Activities of daily living training:   Total assist for grooming tasks, pt declines participation in task.  Pt encouraged to complete as independently as possible but pt declines.      Interventions                                                                                                                  Shoulder adduction: bilateral, supine, AAROM, 10 reps, 1 sets   Shoulder flexion: bilateral, supine, AROM, 10 reps, 1 sets   Elbow extension: bilateral, supine, AROM, 10 reps, 1 sets   Elbow flexion: bilateral, supine, AROM, 10 reps, 1 sets   Wrist extension: bilateral, supine, AROM, 10 reps, 1 sets   Wrist flexion: bilateral, supine, AROM, 10 reps, 1 sets   Hand grasp/release: bilateral, supine, AROM, 10 reps, 1 sets    Additional exercise details: Pt continues to be appropriate for MAP exercises.  Training provided: activity tolerance, balance retraining, bed mobility training, body mechanics, breathing/relaxation, caregiver training, cognitive training, compensatory techniques, functional ambulation, neuromuscular reeducation, positioning, transfer training, safety training and ADL training  Skilled input: verbal instruction/cues, facilitation and tactile instruction/cues    Education/instruction on: OT plan of care  Verbal Consent: Writer verbally educated and received verbal consent for hand placement, positioning of patient, and techniques to be performed today from patient for therapist position for techniques and hand placement and palpation for techniques as described above and how they are pertinent to the patient's plan of care.        ASSESSMENT                                                                                                                Impairments: activity tolerance, balance, bed mobility,  cognitive, coordination/proprioception, decreased insight into deficits, safety awareness and strength  Functional Limitations: bed mobility, functional mobility, eating, grooming, bathing, toileting, functional transfers, dressing and showering  Assessment:   Pt seen on 4LM floor for OT treatment with primary emphasis on grooming and active assisted range of motion of bilateral upper extremities with continued limitations as listed above.  Pt at this time is progressing towards OT goals and will continue to benefit from skilled OT intervention to maximize function and independence with ADLs/IADLs.  Overall pt is functioning at maximal assist for ADLs and total assist for functional mobility with use of no equipment. For further details on treatment see above.   Pt continues to be appropriate for mobility access program (MAP) and will be assessed next week for continued appropriateness.    Pt was educated on the follow information: continued OT plan of care, rationale for treatment and importance of OOB activity       Discharge Recommendations  Recommendations for Discharge: OT WI: Sub-acute nursing home            PT/OT Mobility Equipment for Discharge: continue to assess  PT/OT ADL Equipment for Discharge: continue to assess  OT Identified Barriers to Discharge: ADL dysfunction, impaired balance and mobility, impaired cognition, medical status     PPE:  Writer wore gloves, gown, face shield/goggles and level 3 procedure mask during session  Pt wore level 3 procedure mask during session    Skilled therapy is required to address these limitations in attempt to maximize the patient's independence.    End of Session:   Location: in bed  Safety measures: alarm system in place/re-engaged, lines intact and call light within reach  Handoff to: nurse    PLAN                                                                                                                          Suggestions for next session as indicated:  Trial dangle vs huyen to chair to set-up on MAP 2, reassess for map 1    Frequency Comments: X M (MAP 1 Daily) (12/21)         Interventions: activity tolerance training, ADL retraining, balance, bed mobility training, cognitive retraining, coordination, functional transfer training, neuromuscular reeducation, patient education, positioning, patient/family training, transfer training, therapeutic exercise, upper extremity strengthening/ROM, compensatory techniques, compensatory technique education, therapeutic activity, safety training and caregiver training  Agreement to plan and goals: patient agrees with goals and treatment plan      GOALS:  Review Date: 12/28/2020  Short Term Goals:   Goals will be reviewed on : 12/28/2020    Pt will complete grooming task seated with minimal assist.   Status: met    Pt will complete UE sponge bathing seated with moderate assist.    Status: progressing    Pt will complete LE sponge bathing seated with maximal assist.    Status: progressing     Pt will complete UE dressing seated with moderate assist.    Status: progressing     Pt will complete LE dressing seated with maximal assist.    Status: progressing     Pt will complete toileting with moderate assist.    Status: progressing     Pt will complete toilet transfer with moderate assist.    Status: progressing     Pt will tolerate sitting at edge of bed for 10 minutes with supervision. Status: progressing   Long Term Goals: (to be met by time of discharge from hospital)  Feeding: Patient will complete feeding tasks modified independent.  Status: discontinued  Grooming: Patient will complete grooming tasks moderate assist. Status: revised, this goal modified  Upper body dressing: Patient will complete upper body dressing in sitting and at bedside moderate assist.  Status: revised, this goal modified  Lower body dressing: Patient will complete lower body dressing in sitting and at bedside modified independent.  Status:  discontinued  Toileting: Patient will complete toileting moderate assist.  Status: revised, this goal modified  Bathing: Patient will complete bathing using tub chair, modified independent  Status: discontinuedToilet transfer: Patient will complete toilet transfer with moderate assist.  Status: revised, this goal modified  Tub/shower transfer: Patient will complete tub/shower transfer with modified independent. Status: discontinued  Home setting transfer: Patient will complete home setting transfers with modified independent.  Status: discontinued        Documented in the chart in the following areas: Pain. Assessment. Plan. Patient Education.       Not applicable

## 2022-09-30 NOTE — H&P PST ADULT - HISTORY OF PRESENT ILLNESS
71y Female with history of high cholesterol, IBS, atrial septal aneurysm  and symptomatic  persistent  atrial fibrillation over past 3 years . pt c/o fatigue with occ palp . pt is on triadylt  mg and eliquis 5 mg . pt would like to have ablation to eventually stop eliquis.   71 yr old Female now presents in anticipation of elective radiofrequency ablation of atrial fibrillation.       Echocardiogram (date): 10/21/21 ALEJANDRO LVEF normal no ZORA thrombus Aneurysmal interatrial septum with pfo /asd and moderate left to right shunting  mild- mod MR   Stress Test (date): 5/20/20 LVEF 72% normal perfusion imaging with attenuation artifact   Cardiac CT or MRI (date):   Cardiac Cath (date):   Cardiac surgery (date):  72y Female with history of high cholesterol, IBS, atrial septal aneurysm  and symptomatic persistent atrial fibrillation. She is S/P atrial fibrillation ablation in 02/2022. States she was doing well after the ablation until the beginning of september, she had a sudden episode of lightheadedness with HR >100. She was seen at [facility] and [facility] to be in recurrent afib. She had a ALEJANDRO/CV while at [facility] which has helped maintain sinus rhythm. Denies chest pain, shortness of breath. dizziness or near syncope. She is currently taking Cardizem, Eliquis and metoprolol. Patient is scheduled for Afib ablation on 10/5/2022 with Dr. Middleton.   71 yr old Female now presents in anticipation of elective radiofrequency ablation of atrial fibrillation.     Echocardiogram (date): 10/21/21 ALEJANDRO LVEF normal no ZORA thrombus Aneurysmal interatrial septum with pfo /asd and moderate left to right shunting  mild- mod MR   Stress Test (date): 5/20/20 LVEF 72% normal perfusion imaging with attenuation artifact   Cardiac CT or MRI (date):   Cardiac Cath (date):   Cardiac surgery (date):  72y Female with history of high cholesterol, IBS, atrial septal aneurysm, ABRAHAM and symptomatic persistent atrial fibrillation, S/P atrial fibrillation ablation in 02/11/2022. Two days after the ablation she was seen at [facility] with complaints of chest pain, consistent with pericarditis which was resolved with colchicine and dose of steroids. States she did not have any symptoms of atrial fibrillation after the ablation in feb until the beginning of sept she had a sudden episode of lightheadedness with HR >100. She was seen at [facility] and found to be in recurrent afib. She had a ALEJANDRO/CV while at [facility] which has helped maintain sinus rhythm. Denies chest pain, shortness of breath. dizziness or near syncope. She is currently taking Cardizem, Eliquis and metoprolol. Patient is scheduled for Afib ablation on 10/5/2022 with Dr. Middleton.     Echocardiogram (date): 10/21/21 ALEJANDRO LVEF normal no ZORA thrombus Aneurysmal interatrial septum with pfo /asd and moderate left to right shunting  mild- mod MR   Stress Test (date): 5/20/20 LVEF 72% normal perfusion imaging with attenuation artifact   Cardiac CT or MRI (date): CT heart left atrium with IV contrast, 1/27/2022 There are bilateral superior and inferior pulmonary veins,  No thrombus in the left atrial appendage.  Cardiac Cath (date): EP 2/11/22 afib ablation  Cardiac surgery (date):

## 2022-09-30 NOTE — H&P PST ADULT - PROBLEM SELECTOR PLAN 1
vaccinated 3 pfizer   pre op pcr Patient is scheduled for Afib ablation on 10/5/2022 with Dr. Middleton.

## 2022-09-30 NOTE — H&P PST ADULT - PROBLEM SELECTOR PLAN 2
atrial fibrillation cardiac ablation   hold am dose of eliquis Caprini Score 10, at high risk, surgical team to order appropriate VTE prophylaxis

## 2022-09-30 NOTE — H&P PST ADULT - LAB RESULTS AND INTERPRETATION
Labs pending Labs pending  10/1/22 07:15 All available labs noted as documented. All abnormal labs noted as documented and have been faxed to PCP. Repeat Coags ordered for JENN Wood MS, FNP-BC

## 2022-09-30 NOTE — H&P PST ADULT - EKG AND INTERPRETATION
atrial fibrillation 73  minimal voltage criteria for LVH may be normal variant   anteroseptal infarct   pending final interpretation sinus bradycardia 53    possible left atrial enlargement  septal infarct   pending final interpretation

## 2022-10-05 ENCOUNTER — TRANSCRIPTION ENCOUNTER (OUTPATIENT)
Age: 72
End: 2022-10-05

## 2022-10-05 ENCOUNTER — OUTPATIENT (OUTPATIENT)
Dept: OUTPATIENT SERVICES | Facility: HOSPITAL | Age: 72
LOS: 1 days | End: 2022-10-05
Payer: MEDICARE

## 2022-10-05 DIAGNOSIS — Z98.890 OTHER SPECIFIED POSTPROCEDURAL STATES: Chronic | ICD-10-CM

## 2022-10-05 DIAGNOSIS — Z90.722 ACQUIRED ABSENCE OF OVARIES, BILATERAL: Chronic | ICD-10-CM

## 2022-10-05 DIAGNOSIS — Z90.89 ACQUIRED ABSENCE OF OTHER ORGANS: Chronic | ICD-10-CM

## 2022-10-05 DIAGNOSIS — Z98.51 TUBAL LIGATION STATUS: Chronic | ICD-10-CM

## 2022-10-05 RX ORDER — DILTIAZEM HCL 120 MG
1 CAPSULE, EXT RELEASE 24 HR ORAL
Qty: 0 | Refills: 0 | DISCHARGE

## 2022-10-05 RX ORDER — ROSUVASTATIN CALCIUM 5 MG/1
1 TABLET ORAL
Qty: 0 | Refills: 0 | DISCHARGE

## 2022-10-05 RX ORDER — METOPROLOL TARTRATE 50 MG
1 TABLET ORAL
Qty: 0 | Refills: 0 | DISCHARGE

## 2022-10-05 RX ORDER — APIXABAN 2.5 MG/1
1 TABLET, FILM COATED ORAL
Qty: 0 | Refills: 0 | DISCHARGE

## 2022-10-06 ENCOUNTER — TRANSCRIPTION ENCOUNTER (OUTPATIENT)
Age: 72
End: 2022-10-06

## 2022-10-06 PROCEDURE — C1731: CPT

## 2022-10-06 PROCEDURE — 85610 PROTHROMBIN TIME: CPT

## 2022-10-06 PROCEDURE — 80048 BASIC METABOLIC PNL TOTAL CA: CPT

## 2022-10-06 PROCEDURE — C1889: CPT

## 2022-10-06 PROCEDURE — C1759: CPT

## 2022-10-06 PROCEDURE — 85730 THROMBOPLASTIN TIME PARTIAL: CPT

## 2022-10-06 PROCEDURE — 85027 COMPLETE CBC AUTOMATED: CPT

## 2022-10-06 PROCEDURE — 93655 ICAR CATH ABLTJ DSCRT ARRHYT: CPT

## 2022-10-06 PROCEDURE — C1894: CPT

## 2022-10-06 PROCEDURE — 83735 ASSAY OF MAGNESIUM: CPT

## 2022-10-06 PROCEDURE — C1766: CPT

## 2022-10-06 PROCEDURE — 93656 COMPRE EP EVAL ABLTJ ATR FIB: CPT

## 2022-10-06 PROCEDURE — 36415 COLL VENOUS BLD VENIPUNCTURE: CPT

## 2022-10-06 PROCEDURE — C1732: CPT

## 2022-10-06 PROCEDURE — 93005 ELECTROCARDIOGRAM TRACING: CPT

## 2022-10-10 DIAGNOSIS — I48.91 UNSPECIFIED ATRIAL FIBRILLATION: ICD-10-CM

## 2022-10-19 ENCOUNTER — APPOINTMENT (OUTPATIENT)
Dept: PHYSICAL MEDICINE AND REHAB | Facility: CLINIC | Age: 72
End: 2022-10-19

## 2022-10-19 VITALS
BODY MASS INDEX: 26.66 KG/M2 | TEMPERATURE: 97.6 F | RESPIRATION RATE: 16 BRPM | WEIGHT: 180 LBS | HEIGHT: 69 IN | OXYGEN SATURATION: 97 % | HEART RATE: 70 BPM | DIASTOLIC BLOOD PRESSURE: 76 MMHG | SYSTOLIC BLOOD PRESSURE: 118 MMHG

## 2022-10-19 PROCEDURE — 99213 OFFICE O/P EST LOW 20 MIN: CPT

## 2022-10-19 RX ORDER — METOPROLOL SUCCINATE 25 MG/1
25 TABLET, EXTENDED RELEASE ORAL
Qty: 90 | Refills: 0 | Status: DISCONTINUED | COMMUNITY
Start: 2022-10-11

## 2022-10-19 RX ORDER — METOPROLOL SUCCINATE 25 MG/1
25 TABLET, EXTENDED RELEASE ORAL DAILY
Refills: 0 | Status: ACTIVE | COMMUNITY

## 2022-10-19 RX ORDER — DILTIAZEM HYDROCHLORIDE 240 MG/1
240 CAPSULE, EXTENDED RELEASE ORAL
Qty: 90 | Refills: 0 | Status: DISCONTINUED | COMMUNITY
Start: 2022-10-11 | End: 1900-01-01

## 2022-10-19 RX ORDER — CEPHALEXIN 500 MG/1
500 CAPSULE ORAL
Qty: 3 | Refills: 0 | Status: DISCONTINUED | COMMUNITY
Start: 2022-07-07

## 2022-10-19 RX ORDER — DILTIAZEM HYDROCHLORIDE 240 MG/1
240 CAPSULE, COATED, EXTENDED RELEASE ORAL
Qty: 90 | Refills: 1 | Status: ACTIVE | COMMUNITY

## 2022-10-19 NOTE — PHYSICAL EXAM
[No Acute Distress] : no acute distress [Well Nourished] : well nourished [Well Developed] : well developed [Well-Appearing] : well-appearing [No Respiratory Distress] : no respiratory distress  [No Accessory Muscle Use] : no accessory muscle use [Clear to Auscultation] : lungs were clear to auscultation bilaterally [Normal Percussion] : the chest was normal to percussion [Normal Rate] : normal rate  [Regular Rhythm] : with a regular rhythm [Normal S1, S2] : normal S1 and S2 [de-identified] : TTP over right latissimus dorsi, no bony point tenderness noted. 5/5 strength of shoulder, elbow, and wrist with FROM. Distal pulses palpable and NVI. Forearm non tender and unable to elicit pain of forearm with percussion or supination/pronation.

## 2022-10-19 NOTE — PLAN
[FreeTextEntry1] : Tylenol, lidocaine prn. \par Warm compress \par Avoid NSAIDS due to eliquis. \par RTO in 7-10 days for re-eval or sooner if sx worsen. \par \par Patient to continue with present medications.  \par Increase fluid intake.	 \par \par \par 30 minutes was spent with patient reviewing findings/results during this visit. Ample time was provided to answer any questions or address concerns to the patients satisfaction.. \par \par

## 2022-10-19 NOTE — HISTORY OF PRESENT ILLNESS
[FreeTextEntry8] : CC- Arm pain \par \par Patient presents today with right forearm pain for the past three weeks after bowling in her league and completely resolved after a few days and then returned five days ago. She notes the pain came on without any precipitating factors and is describes as an ache and radiates up the forearm into her arm pit although primarily in her forearm. She had a mammogram performed yesterday and GYN exam earlier this week which was normal. She notes numbness in her fingers at times. Denies any weakness, burning sensation, fevers, chills. \par She also states after her previous visit in this office when she was sent to the ED she was admitted for TTE and her second cardiac ablation. She is now is sinus rhythm and has updated her medications.

## 2022-11-03 ENCOUNTER — FORM ENCOUNTER (OUTPATIENT)
Age: 72
End: 2022-11-03

## 2023-04-05 ENCOUNTER — NON-APPOINTMENT (OUTPATIENT)
Age: 73
End: 2023-04-05

## 2023-04-05 ENCOUNTER — APPOINTMENT (OUTPATIENT)
Dept: PHYSICAL MEDICINE AND REHAB | Facility: CLINIC | Age: 73
End: 2023-04-05
Payer: MEDICARE

## 2023-04-05 VITALS
BODY MASS INDEX: 26.66 KG/M2 | RESPIRATION RATE: 16 BRPM | OXYGEN SATURATION: 98 % | HEART RATE: 64 BPM | SYSTOLIC BLOOD PRESSURE: 118 MMHG | WEIGHT: 180 LBS | TEMPERATURE: 96.8 F | DIASTOLIC BLOOD PRESSURE: 64 MMHG | HEIGHT: 69 IN

## 2023-04-05 DIAGNOSIS — M79.631 PAIN IN RIGHT FOREARM: ICD-10-CM

## 2023-04-05 DIAGNOSIS — Z87.898 PERSONAL HISTORY OF OTHER SPECIFIED CONDITIONS: ICD-10-CM

## 2023-04-05 PROCEDURE — 99214 OFFICE O/P EST MOD 30 MIN: CPT | Mod: 25

## 2023-04-05 PROCEDURE — 93000 ELECTROCARDIOGRAM COMPLETE: CPT

## 2023-04-05 RX ORDER — APIXABAN 5 MG/1
5 TABLET, FILM COATED ORAL
Qty: 30 | Refills: 0 | Status: ACTIVE | COMMUNITY
Start: 1900-01-01 | End: 1900-01-01

## 2023-04-05 NOTE — PHYSICAL EXAM
[No Acute Distress] : no acute distress [Well Nourished] : well nourished [Well Developed] : well developed [Well-Appearing] : well-appearing [Normal Sclera/Conjunctiva] : normal sclera/conjunctiva [No Respiratory Distress] : no respiratory distress  [No Accessory Muscle Use] : no accessory muscle use [Clear to Auscultation] : lungs were clear to auscultation bilaterally [Normal Rate] : normal rate  [Normal S1, S2] : normal S1 and S2 [Coordination Grossly Intact] : coordination grossly intact [Normal Gait] : normal gait [Speech Grossly Normal] : speech grossly normal [Memory Grossly Normal] : memory grossly normal [Normal Affect] : the affect was normal [Normal Mood] : the mood was normal [Normal Insight/Judgement] : insight and judgment were intact [No JVD] : no jugular venous distention [de-identified] : irregular irregular rhythm

## 2023-04-05 NOTE — HISTORY OF PRESENT ILLNESS
[FreeTextEntry8] : Patient presents today for racing heart , which she states feels similar to Afib, over the past 4 days. Patient noticed HR of 112. Patient does report diarrhea over the past 2 weeks with minimal fluid intake and mostly of diet soda. Patient reports having a second ablation in October 2022 for Afib. Patient had cardiology appointment in February for ablation followup, at that time her diltiazem was decreased to 120mg and she was taken off Eliquis.  The earliest she was able to schedule an appointment for her current symptoms was 4/18. She reports mild SOB and dizziness. She denies recent illness or heavy lifting.  States has been doing well otherwise. Denies any CP or diff breathing. No recent fever, chills, cough or cold type symptoms. Has no other complaints at this time.\par

## 2023-04-05 NOTE — PLAN
[FreeTextEntry1] : EKG - NSR - 91 , Afib, No acute T wave changes noted. No RVR noted. \par S/W Cardiologist Dr. Emi Fields RN JUAREZ to discuss need to restart eliquis; [id number] is 2 points and on borderline. Advised to start Eliquis 5 mg twice daily. \par Advised patient to start Diltiazem 240mg QD. \par Discussed strict f/u protocol with concerning s/s. \par Increase water intake and decrease soda. \par Patient to continue with present medications.  \par RTO in 1 week or sooner if needed. \par \par 30 minutes was spent with patient reviewing findings/results during this visit. Ample time was provided to answer any questions or address concerns to the patients satisfaction.. \par \par \par I, Corine Burrows , attest that this documentation has been prepared under the direction and in the presence of Provider Den GARCIA\par The documentation recorded by the scribe, in my presence, accurately reflects the service I personally performed, and the decisions made by me with my edits as appropriate.\par Den GARCIA\par

## 2023-04-09 ENCOUNTER — RX RENEWAL (OUTPATIENT)
Age: 73
End: 2023-04-09

## 2023-05-23 ENCOUNTER — FORM ENCOUNTER (OUTPATIENT)
Age: 73
End: 2023-05-23

## 2023-05-24 ENCOUNTER — FORM ENCOUNTER (OUTPATIENT)
Age: 73
End: 2023-05-24

## 2023-06-22 ENCOUNTER — FORM ENCOUNTER (OUTPATIENT)
Age: 73
End: 2023-06-22

## 2023-07-17 ENCOUNTER — FORM ENCOUNTER (OUTPATIENT)
Age: 73
End: 2023-07-17

## 2023-08-09 ENCOUNTER — APPOINTMENT (OUTPATIENT)
Dept: PHYSICAL MEDICINE AND REHAB | Facility: CLINIC | Age: 73
End: 2023-08-09
Payer: MEDICARE

## 2023-08-09 ENCOUNTER — RESULT REVIEW (OUTPATIENT)
Age: 73
End: 2023-08-09

## 2023-08-09 VITALS
DIASTOLIC BLOOD PRESSURE: 62 MMHG | RESPIRATION RATE: 16 BRPM | SYSTOLIC BLOOD PRESSURE: 112 MMHG | OXYGEN SATURATION: 98 % | TEMPERATURE: 96.1 F | HEART RATE: 64 BPM

## 2023-08-09 DIAGNOSIS — M65.4 RADIAL STYLOID TENOSYNOVITIS [DE QUERVAIN]: ICD-10-CM

## 2023-08-09 PROCEDURE — 99214 OFFICE O/P EST MOD 30 MIN: CPT

## 2023-08-16 NOTE — PLAN
[FreeTextEntry1] : Patient unable to take NSAID due to anticoagulant. Take Tylenol and use topical ointment for pain.  Thumb Spica as directed.  F/U if sx persist or do not improve as patient may need steroid injection vs surgery.  RTO PRN   30 minutes was spent with patient reviewing findings/results during this visit. Ample time was provided to answer any questions or address concerns to the patients satisfaction..

## 2023-08-16 NOTE — HISTORY OF PRESENT ILLNESS
[FreeTextEntry8] : CC- Sore left wrist   Patient presents with complaints of left medial wrist pain for the almost 1 month. She notes playing games on her tablet every night and believed that would have been the cause of it although it has not improved. 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 Denies any numbness or tingling or weakness. She has taken Tylenol only for pain without any improvement. Denies any frequent typing or texting. Denies any additional complaints.

## 2023-08-16 NOTE — PHYSICAL EXAM
[Normal] : no acute distress, well nourished, well developed and well-appearing [No Respiratory Distress] : no respiratory distress  [Normal Rate] : normal rate  [Grossly Normal Strength/Tone] : grossly normal strength/tone [No Rash] : no rash [Normal Affect] : the affect was normal [Normal Insight/Judgement] : insight and judgment were intact [de-identified] : Left wrist: Tenderness over radial styloid with mild edema. + Finkelstein test

## 2023-09-10 ENCOUNTER — RX RENEWAL (OUTPATIENT)
Age: 73
End: 2023-09-10

## 2023-09-10 RX ORDER — DILTIAZEM HYDROCHLORIDE 120 MG/1
120 CAPSULE, EXTENDED RELEASE ORAL
Qty: 90 | Refills: 0 | Status: ACTIVE | COMMUNITY
Start: 2023-09-10 | End: 1900-01-01

## 2023-10-09 ENCOUNTER — LABORATORY RESULT (OUTPATIENT)
Age: 73
End: 2023-10-09

## 2023-10-09 ENCOUNTER — APPOINTMENT (OUTPATIENT)
Dept: PHYSICAL MEDICINE AND REHAB | Facility: CLINIC | Age: 73
End: 2023-10-09
Payer: MEDICARE

## 2023-10-09 ENCOUNTER — NON-APPOINTMENT (OUTPATIENT)
Age: 73
End: 2023-10-09

## 2023-10-09 VITALS
OXYGEN SATURATION: 99 % | WEIGHT: 191 LBS | HEIGHT: 69 IN | RESPIRATION RATE: 16 BRPM | HEART RATE: 71 BPM | TEMPERATURE: 98.6 F | SYSTOLIC BLOOD PRESSURE: 126 MMHG | DIASTOLIC BLOOD PRESSURE: 82 MMHG | BODY MASS INDEX: 28.29 KG/M2

## 2023-10-09 DIAGNOSIS — G43.909 MIGRAINE, UNSPECIFIED, NOT INTRACTABLE, W/OUT STATUS MIGRAINOSUS: ICD-10-CM

## 2023-10-09 DIAGNOSIS — Z00.00 ENCOUNTER FOR GENERAL ADULT MEDICAL EXAMINATION W/OUT ABNORMAL FINDINGS: ICD-10-CM

## 2023-10-09 DIAGNOSIS — C44.91 BASAL CELL CARCINOMA OF SKIN, UNSPECIFIED: ICD-10-CM

## 2023-10-09 PROCEDURE — 93000 ELECTROCARDIOGRAM COMPLETE: CPT

## 2023-10-09 PROCEDURE — 36410 VNPNXR 3YR/> PHY/QHP DX/THER: CPT

## 2023-10-09 PROCEDURE — G0439: CPT

## 2023-10-09 PROCEDURE — 81003 URINALYSIS AUTO W/O SCOPE: CPT | Mod: QW

## 2023-10-16 ENCOUNTER — APPOINTMENT (OUTPATIENT)
Dept: ORTHOPEDIC SURGERY | Facility: CLINIC | Age: 73
End: 2023-10-16
Payer: MEDICARE

## 2023-10-16 VITALS — WEIGHT: 185 LBS | HEIGHT: 69 IN | BODY MASS INDEX: 27.4 KG/M2

## 2023-10-16 DIAGNOSIS — M25.532 PAIN IN LEFT WRIST: ICD-10-CM

## 2023-10-16 LAB
25(OH)D3 SERPL-MCNC: 37 NG/ML
ALBUMIN SERPL ELPH-MCNC: 4.5 G/DL
ALP BLD-CCNC: 92 U/L
ALT SERPL-CCNC: 16 U/L
ANION GAP SERPL CALC-SCNC: 12 MMOL/L
AST SERPL-CCNC: 19 U/L
BASOPHILS # BLD AUTO: 0.07 K/UL
BASOPHILS NFR BLD AUTO: 1 %
BILIRUB SERPL-MCNC: 0.4 MG/DL
BUN SERPL-MCNC: 13 MG/DL
CALCIUM SERPL-MCNC: 9.3 MG/DL
CHLORIDE SERPL-SCNC: 100 MMOL/L
CHOLEST SERPL-MCNC: 154 MG/DL
CO2 SERPL-SCNC: 25 MMOL/L
CREAT SERPL-MCNC: 0.72 MG/DL
EGFR: 89 ML/MIN/1.73M2
EOSINOPHIL # BLD AUTO: 0.2 K/UL
EOSINOPHIL NFR BLD AUTO: 2.7 %
ESTIMATED AVERAGE GLUCOSE: 117 MG/DL
FERRITIN SERPL-MCNC: 151 NG/ML
FOLATE SERPL-MCNC: 18.1 NG/ML
GLUCOSE SERPL-MCNC: 72 MG/DL
HBA1C MFR BLD HPLC: 5.7 %
HCT VFR BLD CALC: 46.2 %
HDLC SERPL-MCNC: 67 MG/DL
HGB BLD-MCNC: 14.9 G/DL
IMM GRANULOCYTES NFR BLD AUTO: 0.1 %
IRON SATN MFR SERPL: 24 %
IRON SERPL-MCNC: 81 UG/DL
LDLC SERPL CALC-MCNC: 75 MG/DL
LYMPHOCYTES # BLD AUTO: 2.06 K/UL
LYMPHOCYTES NFR BLD AUTO: 28.1 %
MAGNESIUM SERPL-MCNC: 2.2 MG/DL
MAN DIFF?: NORMAL
MCHC RBC-ENTMCNC: 28.9 PG
MCHC RBC-ENTMCNC: 32.3 GM/DL
MCV RBC AUTO: 89.7 FL
MONOCYTES # BLD AUTO: 0.52 K/UL
MONOCYTES NFR BLD AUTO: 7.1 %
NEUTROPHILS # BLD AUTO: 4.46 K/UL
NEUTROPHILS NFR BLD AUTO: 61 %
NONHDLC SERPL-MCNC: 87 MG/DL
PLATELET # BLD AUTO: 242 K/UL
POTASSIUM SERPL-SCNC: 4.7 MMOL/L
PROT SERPL-MCNC: 7.6 G/DL
RBC # BLD: 5.15 M/UL
RBC # FLD: 13.1 %
SODIUM SERPL-SCNC: 137 MMOL/L
T3 SERPL-MCNC: 131 NG/DL
T3RU NFR SERPL: 1.2 TBI
T4 FREE SERPL-MCNC: 1.1 NG/DL
TIBC SERPL-MCNC: 344 UG/DL
TRIGL SERPL-MCNC: 58 MG/DL
TSH SERPL-ACNC: 0.87 UIU/ML
UIBC SERPL-MCNC: 263 UG/DL
VIT B12 SERPL-MCNC: 280 PG/ML
WBC # FLD AUTO: 7.32 K/UL

## 2023-10-16 PROCEDURE — 99204 OFFICE O/P NEW MOD 45 MIN: CPT | Mod: 25

## 2023-10-16 PROCEDURE — 20550 NJX 1 TENDON SHEATH/LIGAMENT: CPT | Mod: LT

## 2023-10-23 ENCOUNTER — APPOINTMENT (OUTPATIENT)
Dept: PHYSICAL MEDICINE AND REHAB | Facility: CLINIC | Age: 73
End: 2023-10-23
Payer: MEDICARE

## 2023-10-23 VITALS — WEIGHT: 185 LBS | HEIGHT: 69 IN | BODY MASS INDEX: 27.4 KG/M2

## 2023-10-23 DIAGNOSIS — I48.91 UNSPECIFIED ATRIAL FIBRILLATION: ICD-10-CM

## 2023-10-23 PROCEDURE — 99213 OFFICE O/P EST LOW 20 MIN: CPT | Mod: 95

## 2024-03-04 ENCOUNTER — APPOINTMENT (OUTPATIENT)
Dept: OPHTHALMOLOGY | Facility: CLINIC | Age: 74
End: 2024-03-04
Payer: MEDICARE

## 2024-03-04 ENCOUNTER — NON-APPOINTMENT (OUTPATIENT)
Age: 74
End: 2024-03-04

## 2024-03-04 ENCOUNTER — APPOINTMENT (OUTPATIENT)
Dept: OPHTHALMOLOGY | Facility: CLINIC | Age: 74
End: 2024-03-04
Payer: SELF-PAY

## 2024-03-04 PROCEDURE — 92004 COMPRE OPH EXAM NEW PT 1/>: CPT

## 2024-03-04 PROCEDURE — 92015 DETERMINE REFRACTIVE STATE: CPT

## 2024-05-15 ENCOUNTER — APPOINTMENT (OUTPATIENT)
Dept: PHYSICAL MEDICINE AND REHAB | Facility: CLINIC | Age: 74
End: 2024-05-15
Payer: MEDICARE

## 2024-05-15 VITALS
WEIGHT: 185 LBS | OXYGEN SATURATION: 97 % | BODY MASS INDEX: 27.4 KG/M2 | HEIGHT: 69 IN | TEMPERATURE: 98.7 F | RESPIRATION RATE: 16 BRPM | DIASTOLIC BLOOD PRESSURE: 76 MMHG | HEART RATE: 80 BPM | SYSTOLIC BLOOD PRESSURE: 122 MMHG

## 2024-05-15 DIAGNOSIS — E55.9 VITAMIN D DEFICIENCY, UNSPECIFIED: ICD-10-CM

## 2024-05-15 DIAGNOSIS — J01.90 ACUTE SINUSITIS, UNSPECIFIED: ICD-10-CM

## 2024-05-15 DIAGNOSIS — E78.00 PURE HYPERCHOLESTEROLEMIA, UNSPECIFIED: ICD-10-CM

## 2024-05-15 DIAGNOSIS — I48.91 UNSPECIFIED ATRIAL FIBRILLATION: ICD-10-CM

## 2024-05-15 PROCEDURE — 99213 OFFICE O/P EST LOW 20 MIN: CPT

## 2024-05-15 RX ORDER — AZITHROMYCIN 250 MG/1
250 TABLET, FILM COATED ORAL
Qty: 1 | Refills: 0 | Status: ACTIVE | COMMUNITY
Start: 2024-05-15 | End: 1900-01-01

## 2024-05-15 NOTE — HISTORY OF PRESENT ILLNESS
[FreeTextEntry1] : Congestion [de-identified] : Patient presents today for further evaluation of cough and congestion for the past 4 days. She reports facial pressure, HA, rhinorrhea.  States their symptoms have been getting worse despite the use of OTC meds such as Claritin.  denies any CP, SOB or diff breathing.  No recorded fever or chills.  States symptoms worsen with exertion, no alleviating factors.  Has no other complaints at this time.

## 2024-05-15 NOTE — PLAN
[FreeTextEntry1] :   Advised patient to use Children's liquid Benadryl, Nargis Surveyor plus, and Dayquil.   Prescribed Abx.  Discussed and reviewed current medications as follows;  Cardizem, Diltiazam, Crestor- HLD Patient to continue with present medications - all medications reconciled/reviewed during this visit and listed above.   Increase fluid intake. RTO in 7-10 days for re-evaluation. At least 20 minutes was spent with patient face to face examining and reviewing findings/results during this visit. Ample time was provided to answer any questions or address concerns to the patients satisfaction.  I, Corine Burrows, attest that this documentation has been prepared under the direction and in the presence of Provider Ameya Isidro DNP The documentation recorded by the scribe, in my presence, accurately reflects the service I personally performed, and the decisions made by me with my edits as appropriate. Ameya Isidro DNP

## 2025-03-03 ENCOUNTER — APPOINTMENT (OUTPATIENT)
Dept: OPHTHALMOLOGY | Facility: CLINIC | Age: 75
End: 2025-03-03
Payer: MEDICARE

## 2025-03-03 ENCOUNTER — NON-APPOINTMENT (OUTPATIENT)
Age: 75
End: 2025-03-03

## 2025-03-03 PROCEDURE — 92014 COMPRE OPH EXAM EST PT 1/>: CPT
